# Patient Record
Sex: FEMALE | Race: WHITE | NOT HISPANIC OR LATINO | Employment: OTHER | URBAN - METROPOLITAN AREA
[De-identification: names, ages, dates, MRNs, and addresses within clinical notes are randomized per-mention and may not be internally consistent; named-entity substitution may affect disease eponyms.]

---

## 2017-08-21 LAB — MAMMOGRAM: NORMAL

## 2018-03-12 ENCOUNTER — HOSPITAL ENCOUNTER (EMERGENCY)
Facility: CLINIC | Age: 73
Discharge: HOME | End: 2018-03-12
Attending: EMERGENCY MEDICINE | Admitting: EMERGENCY MEDICINE
Payer: MEDICARE

## 2018-03-12 ENCOUNTER — APPOINTMENT (OUTPATIENT)
Dept: GENERAL RADIOLOGY | Facility: CLINIC | Age: 73
End: 2018-03-12
Attending: EMERGENCY MEDICINE
Payer: MEDICARE

## 2018-03-12 VITALS
OXYGEN SATURATION: 100 % | SYSTOLIC BLOOD PRESSURE: 155 MMHG | TEMPERATURE: 97.8 F | RESPIRATION RATE: 8 BRPM | HEIGHT: 61 IN | DIASTOLIC BLOOD PRESSURE: 77 MMHG

## 2018-03-12 DIAGNOSIS — R07.9 CHEST PAIN, UNSPECIFIED TYPE: ICD-10-CM

## 2018-03-12 LAB
ALBUMIN SERPL-MCNC: 3.7 G/DL (ref 3.4–5)
ALP SERPL-CCNC: 110 U/L (ref 40–150)
ALT SERPL W P-5'-P-CCNC: 18 U/L (ref 0–50)
ANION GAP SERPL CALCULATED.3IONS-SCNC: 6 MMOL/L (ref 3–14)
AST SERPL W P-5'-P-CCNC: 20 U/L (ref 0–45)
BASOPHILS # BLD AUTO: 0 10E9/L (ref 0–0.2)
BASOPHILS NFR BLD AUTO: 0.3 %
BILIRUB SERPL-MCNC: 0.4 MG/DL (ref 0.2–1.3)
BUN SERPL-MCNC: 35 MG/DL (ref 7–30)
CALCIUM SERPL-MCNC: 10.1 MG/DL (ref 8.5–10.1)
CHLORIDE SERPL-SCNC: 105 MMOL/L (ref 94–109)
CO2 SERPL-SCNC: 24 MMOL/L (ref 20–32)
CREAT SERPL-MCNC: 0.73 MG/DL (ref 0.52–1.04)
DIFFERENTIAL METHOD BLD: NORMAL
EOSINOPHIL # BLD AUTO: 0 10E9/L (ref 0–0.7)
EOSINOPHIL NFR BLD AUTO: 0.5 %
ERYTHROCYTE [DISTWIDTH] IN BLOOD BY AUTOMATED COUNT: 13.4 % (ref 10–15)
GFR SERPL CREATININE-BSD FRML MDRD: 78 ML/MIN/1.7M2
GLUCOSE SERPL-MCNC: 99 MG/DL (ref 70–99)
HCT VFR BLD AUTO: 39 % (ref 35–47)
HGB BLD-MCNC: 12.6 G/DL (ref 11.7–15.7)
IMM GRANULOCYTES # BLD: 0 10E9/L (ref 0–0.4)
IMM GRANULOCYTES NFR BLD: 0.3 %
LYMPHOCYTES # BLD AUTO: 1.4 10E9/L (ref 0.8–5.3)
LYMPHOCYTES NFR BLD AUTO: 23.2 %
MCH RBC QN AUTO: 29.7 PG (ref 26.5–33)
MCHC RBC AUTO-ENTMCNC: 32.3 G/DL (ref 31.5–36.5)
MCV RBC AUTO: 92 FL (ref 78–100)
MONOCYTES # BLD AUTO: 0.7 10E9/L (ref 0–1.3)
MONOCYTES NFR BLD AUTO: 11 %
NEUTROPHILS # BLD AUTO: 3.9 10E9/L (ref 1.6–8.3)
NEUTROPHILS NFR BLD AUTO: 64.7 %
PLATELET # BLD AUTO: 170 10E9/L (ref 150–450)
POTASSIUM SERPL-SCNC: 4 MMOL/L (ref 3.4–5.3)
PROT SERPL-MCNC: 7 G/DL (ref 6.8–8.8)
RBC # BLD AUTO: 4.24 10E12/L (ref 3.8–5.2)
SODIUM SERPL-SCNC: 135 MMOL/L (ref 133–144)
TROPONIN I SERPL-MCNC: <0.015 UG/L (ref 0–0.04)
WBC # BLD AUTO: 6 10E9/L (ref 4–11)

## 2018-03-12 PROCEDURE — 99285 EMERGENCY DEPT VISIT HI MDM: CPT | Mod: 25

## 2018-03-12 PROCEDURE — 80053 COMPREHEN METABOLIC PANEL: CPT | Performed by: FAMILY MEDICINE

## 2018-03-12 PROCEDURE — 99285 EMERGENCY DEPT VISIT HI MDM: CPT | Mod: 25 | Performed by: FAMILY MEDICINE

## 2018-03-12 PROCEDURE — 93010 ELECTROCARDIOGRAM REPORT: CPT | Mod: Z6 | Performed by: FAMILY MEDICINE

## 2018-03-12 PROCEDURE — 84484 ASSAY OF TROPONIN QUANT: CPT | Performed by: FAMILY MEDICINE

## 2018-03-12 PROCEDURE — 93005 ELECTROCARDIOGRAM TRACING: CPT

## 2018-03-12 PROCEDURE — 71046 X-RAY EXAM CHEST 2 VIEWS: CPT

## 2018-03-12 PROCEDURE — 85025 COMPLETE CBC W/AUTO DIFF WBC: CPT | Performed by: FAMILY MEDICINE

## 2018-03-12 NOTE — DISCHARGE INSTRUCTIONS
*CHEST PAIN, UNCERTAIN CAUSE    Based on your exam today, the exact cause of your chest pain is not certain. Your condition does not seem serious at this time, and your pain does not appear to be coming from your heart. However, sometimes the signs of a serious problem take more time to appear. Therefore, watch for the warning signs listed below.  HOME CARE:  1. Rest today and avoid strenuous activity.  2. Take any prescribed medicine as directed.  FOLLOW UP with your doctor in 1-3 days.   GET PROMPT MEDICAL ATTENTION if any of the following occur:    A change in the type of pain: if it feels different, becomes more severe, lasts longer, or begins to spread into your shoulder, arm, neck, jaw or back    Shortness of breath or increased pain with breathing    Weakness, dizziness, or fainting    Cough with blood or dark colored sputum (phlegm)    Fever over 101  F (38.3  C)    Swelling, pain or redness in one leg    5035-1561 The "[employer]". [address] [place] [address]. All rights reserved. This information is not intended as a substitute for professional medical care. Always follow your healthcare professional's instructions.  This information has been modified by your health care provider with permission from the publisher.    Call primary care today to set up cardiac stress test.  Try to avoid ibuprofen.  It is reasonable for you to take 81 mg aspirin daily.

## 2018-03-12 NOTE — ED AVS SNAPSHOT
[facility] Emergency Department    [address] [facility] [address]    Phone:  [phone]    Fax:  [phone]                                       Tracey Mandujano   MRN: [id number]    Department:  [facility] Emergency Department   Date of Visit:  3/12/2018           After Visit Summary Signature Page     I have received my discharge instructions, and my questions have been answered. I have discussed any challenges I see with this plan with the nurse or doctor.    ..........................................................................................................................................  Patient/Patient Representative Signature      ..........................................................................................................................................  Patient Representative Print Name and Relationship to Patient    ..................................................               ................................................  Date                                            Time    ..........................................................................................................................................  Reviewed by Signature/Title    ...................................................              ..............................................  Date                                                            Time

## 2018-03-12 NOTE — ED PROVIDER NOTES
History     Chief Complaint   Patient presents with     Chest Pain     HPI  Tracey Mandujano is a 72 year old female, with a history of HTN, Hyperlipidemia, DM 2 and spinal stenosis, who presents with chest pain. The chest pain started at 0815 this am when she was exercising at her aerobics class. She does not currently have any chest pain. She describes the chest pain more as a chest tightness that was 4-5/10, nonradiating, and was present intermittently for about 20 minutes. Nothing seemed to make the pain better or worse. The pain was not aggravated by movement or position. She continued to do her exercises during the 20 minutes and did not have shortness of breath, nausea, vomiting, headache, dizziness, abdominal pain, fevers or chills. No history of DVT, no lower leg swelling or tenderness. She has never had a heart attack or stroke. She had a normal stress echo in 2014. She has not had surgery recently. She has been being seen for spinal stenosis and has been taking 600-800 mg of Ibuprofen daily for the pain. She does not take aspirin or any blood thinners.     Problem List:    There are no active problems to display for this patient.  Hypertension  Hyperlipidemia  Spinal stenosis      Past Medical History:    No past medical history on file.  Hypertension  Hyperlipidemia  Diabetes mellitus, type 2  Spinal Stenosis    Past Surgical History:    No past surgical history on file.  No recent surgeries.     Family History:    No family history on file.   No family history of myocardial infarction or heart disease.  Positive family history of stroke in mother at age 79. Lived till 89.    Social History:  Marital Status:   [5]  Social History   Substance Use Topics     Smoking status: Not on file     Smokeless tobacco: Not on file     Alcohol use Not on file      She does not smoke or drink.   Lives alone with children living nearby.     Medications:      lisinopril (PRINIVIL,ZESTRIL) 5 MG tablet  "  citalopram (CELEXA) 20 MG tablet   simvastatin (ZOCOR) 20 MG tablet         Review of Systems   Constitutional: No fever, chills, malaise, weakness, or diaphoresis  HEENT: No blurry or change in vision. No trauma or falls.   Respiratory: No shortness of breath, cough, hemoptysis  Cardiovascular: Positive for chest pain at 0815 this am. Negative for current chest pain. No palpitations, syncope or lower extremity edema  Gastrointestinal: No nausea, vomiting, heartburn, diarrhea, constipation, or abdominal pain  Genitourinary: No urinary urgency or change in frequency or incontinence  Skin: No rashes  Musculoskeletal: No arthralgias, swelling, weakness, or chest wall tenderness  Hematologic: No bruising  Neurologic: No headache, dizziness, focal weakness, sensory loss        Physical Exam   BP: 139/76  Heart Rate: 56  Temp: 97.8  F (36.6  C)  Resp: 16  Height: 154.9 cm (5' 1\")  SpO2: 99 %      Physical Exam   Constitutional: well appearing well nourished, in no distress  HEENT: Normocephalic, atraumatic, conjunctiva clear, PERRLA, normal external ears, moist mucous membranes, mucosa non-inflamed  Cardiovascular: Rate 56. Normal rhythm, normal S1 and S2, no rubs, murmurs or gallops, peripheral pulses strong and equal bilaterally, no peripheral edema.   Respiratory: lung sounds clear to auscultation and equal bilaterally, no wheezes, crackles, or rhonchi  GI: soft, non-distended abdomen, non-tender to palpation, normoactive bowel sounds  Skin: warm and dry  Musculoskeletal: ROM intact of spine and extremities. No chest wall tenderness. No lower extremity tenderness, areas of warmth or erythema or stiffness.   Neuro: alert and oriented to person, time, and place, intact recent and remote memory, CN II-XII grossly intact  Psych: appropriate mood and affect, judgement and insight, cooperative with exam        ED Course     ED Course     Procedures               EKG Interpretation:      Interpreted by GENE Rodriguez " reviewed:   Symptoms at time of EKG: Asymptomatic   Rhythm: Sinus bradycardia  Rate: 54  Axis: normal  Ectopy: none  Conduction: normal  ST Segments/ T Waves: No ST-T wave changes  Q Waves: none  Comparison to prior: Unchanged from EKG in 2015.     Clinical Impression: normal EKG          Critical Care time:  None  Well's score Total: 0   - Clinical signs and symptoms of DVT: No (0)   - PE is #1 diagnosis OR equally likely: No (0)   - Heart rate > 100: No (0)   - Immobilization at least 3 days OR surgery in the previous 4 weeks: No (0)   - Previously objectively diagnosed PE or DVT: No (0)   - Hemoptysis: No (0)   - Malignancy w/ treatment within 6 months or palliative: No (0)               Results for orders placed or performed during the hospital encounter of 03/12/18 (from the past 24 hour(s))   CBC with platelets differential   Result Value Ref Range    WBC 6.0 4.0 - 11.0 10e9/L    RBC Count 4.24 3.8 - 5.2 10e12/L    Hemoglobin 12.6 11.7 - 15.7 g/dL    Hematocrit 39.0 35.0 - 47.0 %    MCV 92 78 - 100 fl    MCH 29.7 26.5 - 33.0 pg    MCHC 32.3 31.5 - 36.5 g/dL    RDW 13.4 10.0 - 15.0 %    Platelet Count 170 150 - 450 10e9/L    Diff Method Automated Method     % Neutrophils 64.7 %    % Lymphocytes 23.2 %    % Monocytes 11.0 %    % Eosinophils 0.5 %    % Basophils 0.3 %    % Immature Granulocytes 0.3 %    Absolute Neutrophil 3.9 1.6 - 8.3 10e9/L    Absolute Lymphocytes 1.4 0.8 - 5.3 10e9/L    Absolute Monocytes 0.7 0.0 - 1.3 10e9/L    Absolute Eosinophils 0.0 0.0 - 0.7 10e9/L    Absolute Basophils 0.0 0.0 - 0.2 10e9/L    Abs Immature Granulocytes 0.0 0 - 0.4 10e9/L   Comprehensive metabolic panel   Result Value Ref Range    Sodium 135 133 - 144 mmol/L    Potassium 4.0 3.4 - 5.3 mmol/L    Chloride 105 94 - 109 mmol/L    Carbon Dioxide 24 20 - 32 mmol/L    Anion Gap 6 3 - 14 mmol/L    Glucose 99 70 - 99 mg/dL    Urea Nitrogen 35 (H) 7 - 30 mg/dL    Creatinine 0.73 0.52 - 1.04 mg/dL    GFR Estimate 78 >60 mL/min/1.7m2     GFR Estimate If Black >90 >60 mL/min/1.7m2    Calcium 10.1 8.5 - 10.1 mg/dL    Bilirubin Total 0.4 0.2 - 1.3 mg/dL    Albumin 3.7 3.4 - 5.0 g/dL    Protein Total 7.0 6.8 - 8.8 g/dL    Alkaline Phosphatase 110 40 - 150 U/L    ALT 18 0 - 50 U/L    AST 20 0 - 45 U/L   Troponin I   Result Value Ref Range    Troponin I ES <0.015 0.000 - 0.045 ug/L   Chest XR,  PA & LAT    Narrative    XR CHEST 2 VW 3/12/2018 11:27 AM     HISTORY: pain;     COMPARISON: None      Impression    IMPRESSION: The cardiac silhouette and pulmonary vasculature are  within normal limits. No evidence of pneumothorax or pleural effusion.  The lungs are clear. There is a right shoulder prosthesis. Severe  degenerative changes in the left shoulder.    JUAN SAMUEL MD       Assessments & Plan (with Medical Decision Making)   Tracey Mandujano is a 72 year old female, with a history of HTN, Hyperlipidemia, DM 2 and spinal stenosis, who presents with chest discomfort.  Patient presents with precordial/substernal chest discomfort poorly localized, which started at 0815 this am without other signs of cardiac etiology including shortness of breath, diaphoresis, or nausea. She continued with her workout during the pain. She has had a normal stress echo in 2014 and last normal EKG in 2015 via care everywhere. Normal EKG today showing sinus bradycardia with a rate of 54. Troponin negative. CBC WNL. No current evidence to support ACS.  Recommend follow-up primary care for further evaluation, outpatient cardiac stress.  Recommend 81 mg aspirin daily, avoid ibuprofen, take Tylenol for discomfort.  Less likely a PE because she is not tachycardic, no SOB, Well's score of 0 and CXR shows no acute abnormalities. Musculoskeletal etiology is possible, but less likely given no chest wall tenderness and pain was not affected by movement or position. GERD could be a possibility or a hiatal hernia. Patient takes Omeprazole 20 mg a day and has no epigastric discomfort,  but still a possibility especially considering she takes Ibuprofen 600-800 mg a day that could be leading to some gastritis.       I have reviewed the nursing notes.    I have reviewed the findings, diagnosis, plan and need for follow up with the patient.       Discharge Medication List as of 3/12/2018 11:42 AM        I interviewed and examined the patient, supervised workup, discussed differential diagnoses, reviewed results of studies, agree with assessment, plan and contents of note.  Dr. Jessee Zafar        Final diagnoses:   Chest pain, unspecified type       Derrick Apodaca, MS3      3/12/2018   [facility] EMERGENCY DEPARTMENT     Jessee Zafar MD  03/12/18 5905

## 2018-03-12 NOTE — ED NOTES
Pt here with chest pain that started about 2 hours ago while she was at the gym. Pt was lifting weights in an aerobics class when the pain started, lasted about 15 minutes, currently pain free. She has had chest pain twice before and had stress tests done, was negative. Denies radiation, nausea or diaphoresis but states 'I did feel funny in my mouth'

## 2018-03-12 NOTE — ED AVS SNAPSHOT
[facility] Emergency Department    [address] [address]    Phone:  [phone]    Fax:  [phone]                                       Tracey Mandujano   MRN: [id number]    Department:  [facility] Emergency Department   Date of Visit:  3/12/2018           Patient Information     Date Of Birth          1945        Your diagnoses for this visit were:     Chest pain, unspecified type        You were seen by Jessee Zafar MD.      Follow-up Information     Follow up with Clinic, [facility].    Specialty:  Clinic    Contact information:    [address] [facility] [id number] [phone]          Discharge Instructions          *CHEST PAIN, UNCERTAIN CAUSE    Based on your exam today, the exact cause of your chest pain is not certain. Your condition does not seem serious at this time, and your pain does not appear to be coming from your heart. However, sometimes the signs of a serious problem take more time to appear. Therefore, watch for the warning signs listed below.  HOME CARE:  1. Rest today and avoid strenuous activity.  2. Take any prescribed medicine as directed.  FOLLOW UP with your doctor in 1-3 days.   GET PROMPT MEDICAL ATTENTION if any of the following occur:    A change in the type of pain: if it feels different, becomes more severe, lasts longer, or begins to spread into your shoulder, arm, neck, jaw or back    Shortness of breath or increased pain with breathing    Weakness, dizziness, or fainting    Cough with blood or dark colored sputum (phlegm)    Fever over 101  F (38.3  C)    Swelling, pain or redness in one leg    0379-7819 [employer]. [address] [place] [address]. All rights reserved. This information is not intended as a substitute for professional medical care. Always follow your healthcare professional's instructions.  This information has been modified by your health care provider with permission from the  publisher.    Call primary care today to set up cardiac stress test.  Try to avoid ibuprofen.  It is reasonable for you to take 81 mg aspirin daily.    24 Hour Appointment Hotline       To make an appointment at any [facility], call [phone] ([phone]). If you don't have a family doctor or clinic, we will help you find one. [facility] clinics are conveniently located to serve the needs of you and your family.             Review of your medicines      Our records show that you are taking the medicines listed below. If these are incorrect, please call your family doctor or clinic.        Dose / Directions Last dose taken    citalopram 20 MG tablet   Commonly known as:  celeXA   Dose:  20 mg        Take 20 mg by mouth daily   Refills:  3        lisinopril 5 MG tablet   Commonly known as:  PRINIVIL/ZESTRIL        Refills:  0        simvastatin 20 MG tablet   Commonly known as:  ZOCOR        Refills:  0                Procedures and tests performed during your visit     CBC with platelets differential    Cardiac Continuous Monitoring    Chest XR,  PA & LAT    Comprehensive metabolic panel    EKG 12 lead    Peripheral IV catheter    Troponin I      Orders Needing Specimen Collection     None      Pending Results     No orders found from 3/10/2018 to 3/13/2018.            Pending Culture Results     No orders found from 3/10/2018 to 3/13/2018.            Pending Results Instructions     If you had any lab results that were not finalized at the time of your Discharge, you can call the ED Lab Result RN at [phone]. You will be contacted by this team for any positive Lab results or changes in treatment. The nurses are available 7 days a week from 10A to 6:30P.  You can leave a message 24 hours per day and they will return your call.        Test Results From Your Hospital Stay        3/12/2018 10:41 AM      Component Results     Component Value Ref Range & Units Status    WBC 6.0 4.0 - 11.0 10e9/L Final     RBC Count 4.24 3.8 - 5.2 10e12/L Final    Hemoglobin 12.6 11.7 - 15.7 g/dL Final    Hematocrit 39.0 35.0 - 47.0 % Final    MCV 92 78 - 100 fl Final    MCH 29.7 26.5 - 33.0 pg Final    MCHC 32.3 31.5 - 36.5 g/dL Final    RDW 13.4 10.0 - 15.0 % Final    Platelet Count 170 150 - 450 10e9/L Final    Diff Method Automated Method  Final    % Neutrophils 64.7 % Final    % Lymphocytes 23.2 % Final    % Monocytes 11.0 % Final    % Eosinophils 0.5 % Final    % Basophils 0.3 % Final    % Immature Granulocytes 0.3 % Final    Absolute Neutrophil 3.9 1.6 - 8.3 10e9/L Final    Absolute Lymphocytes 1.4 0.8 - 5.3 10e9/L Final    Absolute Monocytes 0.7 0.0 - 1.3 10e9/L Final    Absolute Eosinophils 0.0 0.0 - 0.7 10e9/L Final    Absolute Basophils 0.0 0.0 - 0.2 10e9/L Final    Abs Immature Granulocytes 0.0 0 - 0.4 10e9/L Final         3/12/2018 11:18 AM      Component Results     Component Value Ref Range & Units Status    Sodium 135 133 - 144 mmol/L Final    Potassium 4.0 3.4 - 5.3 mmol/L Final    Chloride 105 94 - 109 mmol/L Final    Carbon Dioxide 24 20 - 32 mmol/L Final    Anion Gap 6 3 - 14 mmol/L Final    Glucose 99 70 - 99 mg/dL Final    Urea Nitrogen 35 (H) 7 - 30 mg/dL Final    Creatinine 0.73 0.52 - 1.04 mg/dL Final    GFR Estimate 78 >60 mL/min/1.7m2 Final    Non  GFR Calc    GFR Estimate If Black >90 >60 mL/min/1.7m2 Final    African American GFR Calc    Calcium 10.1 8.5 - 10.1 mg/dL Final    Bilirubin Total 0.4 0.2 - 1.3 mg/dL Final    Albumin 3.7 3.4 - 5.0 g/dL Final    Protein Total 7.0 6.8 - 8.8 g/dL Final    Alkaline Phosphatase 110 40 - 150 U/L Final    ALT 18 0 - 50 U/L Final    AST 20 0 - 45 U/L Final         3/12/2018 11:18 AM      Component Results     Component Value Ref Range & Units Status    Troponin I ES <0.015 0.000 - 0.045 ug/L Final    The 99th percentile for upper reference range is 0.045 ug/L.  Troponin values   in the range of 0.045 - 0.120 ug/L may be associated with risks of adverse  "  clinical events.           3/12/2018 11:29 AM      Narrative     XR CHEST 2 VW 3/12/2018 11:27 AM     HISTORY: pain;     COMPARISON: None        Impression     IMPRESSION: The cardiac silhouette and pulmonary vasculature are  within normal limits. No evidence of pneumothorax or pleural effusion.  The lungs are clear. There is a right shoulder prosthesis. Severe  degenerative changes in the left shoulder.    JUAN SAMUEL MD                Thank you for choosing [facility]       Thank you for choosing [facility] for your care. Our goal is always to provide you with excellent care. Hearing back from our patients is one way we can continue to improve our services. Please take a few minutes to complete the written survey that you may receive in the mail after you visit with us. Thank you!        [employer] Information     [employer] lets you send messages to your doctor, view your test results, renew your prescriptions, schedule appointments and more. To sign up, go to [email or web address] . Click on \"Log in\" on the left side of the screen, which will take you to the Welcome page. Then click on \"Sign up Now\" on the right side of the page.     You will be asked to enter the access code listed below, as well as some personal information. Please follow the directions to create your username and password.     Your access code is: [email or web address]  Expires: 6/10/2018 11:42 AM     Your access code will  in 90 days. If you need help or a new code, please call your [facility] clinic or [phone].        Care EveryWhere ID     This is your Care EveryWhere ID. This could be used by other organizations to access your [facility] medical records  [id number]        Equal Access to Services     [facility]: Hadii clarisa Gibbs, waaxda luqadaha, qaybta kaaldov nunez, piedad albert. So [facility] [phone].    ATENCIÓN: Si habla español, tiene a grimm disposición servicios gratuitos de asistencia " christ Meltonhorace al [phone].    We comply with applicable federal civil rights laws and [place] laws. We do not discriminate on the basis of race, color, national origin, age, disability, sex, sexual orientation, or gender identity.            After Visit Summary       This is your record. Keep this with you and show to your community pharmacist(s) and doctor(s) at your next visit.

## 2019-03-18 ENCOUNTER — TRANSFERRED RECORDS (OUTPATIENT)
Dept: HEALTH INFORMATION MANAGEMENT | Facility: CLINIC | Age: 74
End: 2019-03-18

## 2019-03-27 ENCOUNTER — TRANSFERRED RECORDS (OUTPATIENT)
Dept: HEALTH INFORMATION MANAGEMENT | Facility: CLINIC | Age: 74
End: 2019-03-27

## 2019-04-23 ENCOUNTER — TRANSFERRED RECORDS (OUTPATIENT)
Dept: HEALTH INFORMATION MANAGEMENT | Facility: CLINIC | Age: 74
End: 2019-04-23

## 2019-04-24 ENCOUNTER — TRANSFERRED RECORDS (OUTPATIENT)
Dept: HEALTH INFORMATION MANAGEMENT | Facility: CLINIC | Age: 74
End: 2019-04-24

## 2019-06-27 ENCOUNTER — DOCUMENTATION ONLY (OUTPATIENT)
Dept: CARE COORDINATION | Facility: CLINIC | Age: 74
End: 2019-06-27

## 2019-07-17 NOTE — TELEPHONE ENCOUNTER
RECORDS RECEIVED FROM: lumbar spine radiculopathy/MRI, EMG done/Pt has also done PT/referred by Dr Lilli Carlos at [facility]/recs and referral faxed to clinic/appt per pt   DATE RECEIVED: Aug 6, 2019    NOTES STATUS DETAILS   OFFICE NOTE from referring provider Received  3/27/19 [facility]   OFFICE NOTE from other specialist Received  [facility] 3/18/19   DISCHARGE SUMMARY from hospital N/A    DISCHARGE REPORT from the ER N/A    OPERATIVE REPORT N/A    MEDICATION LIST Received     IMPLANT RECORD/STICKER N/A    LABS     CBC/DIFF N/A    CULTURES N/A    INJECTIONS DONE IN RADIOLOGY N/a     MRI Received  3/18/19   CT SCAN N/a     XRAYS (IMAGES & REPORTS) N/a     TUMOR     PATHOLOGY  Slides & report N/A      07/17/19   5:23 PM  Appointment notes incorrect: no records or referral have been received in clinic  Faxed request for all recs to [facility]    07/29/19   2:19 PM  Faxed 2nd request to [facility]    07/31/19   3:58 PM  Called [facility], and was told records were faxed on 7/19 and images were mailed. I requested that they be re-faxed, as no records have yet been received.

## 2019-08-06 ENCOUNTER — PRE VISIT (OUTPATIENT)
Dept: ORTHOPEDICS | Facility: CLINIC | Age: 74
End: 2019-08-06

## 2019-08-06 ENCOUNTER — OFFICE VISIT (OUTPATIENT)
Dept: ORTHOPEDICS | Facility: CLINIC | Age: 74
End: 2019-08-06
Payer: MEDICARE

## 2019-08-06 VITALS — BODY MASS INDEX: 34.93 KG/M2 | RESPIRATION RATE: 16 BRPM | WEIGHT: 185 LBS | HEIGHT: 61 IN

## 2019-08-06 DIAGNOSIS — M51.16 LUMBAR DISC HERNIATION WITH RADICULOPATHY: ICD-10-CM

## 2019-08-06 DIAGNOSIS — M54.16 LUMBAR RADICULOPATHY: Primary | ICD-10-CM

## 2019-08-06 RX ORDER — FOLIC ACID 1 MG/1
1 TABLET ORAL EVERY MORNING
COMMUNITY
Start: 2009-11-18

## 2019-08-06 RX ORDER — OXYBUTYNIN CHLORIDE 5 MG/1
10 TABLET, EXTENDED RELEASE ORAL EVERY MORNING
COMMUNITY
Start: 2019-07-25

## 2019-08-06 RX ORDER — GABAPENTIN 300 MG/1
CAPSULE ORAL
COMMUNITY
Start: 2018-10-18 | End: 2019-11-04

## 2019-08-06 RX ORDER — METHYLPREDNISOLONE 4 MG
TABLET, DOSE PACK ORAL
Qty: 21 TABLET | Refills: 0 | Status: SHIPPED | OUTPATIENT
Start: 2019-08-06 | End: 2019-12-16

## 2019-08-06 RX ORDER — GABAPENTIN 300 MG/1
300 CAPSULE ORAL 4 TIMES DAILY
Qty: 120 CAPSULE | Refills: 1 | Status: SHIPPED | OUTPATIENT
Start: 2019-08-06 | End: 2019-11-04

## 2019-08-06 ASSESSMENT — MIFFLIN-ST. JEOR: SCORE: 1281.53

## 2019-08-06 NOTE — LETTER
8/6/2019       RE: Tracey Mandujano  [address] [place] [address]     Dear Colleague,    Thank you for referring your patient, Tracey Mandujano, to the [facility]. Please see a copy of my visit note below.    Notes radiating left pain radicular symptoms, 2 years.     HISTORY OF PRESENT ILLNESS  Ms. Mandujano is a pleasant 73 year old year old female who presents to clinic today with chronic low back pain  Tracey explains that she has had on/off low back pain with radicular symptoms for over 2 years  She has pain and tingling that goes down her legs  Location: low back  Quality:  achy pain    Severity: 6/10 at worst    Duration: see above  Timing: occurs intermittently    Modifying factors:  resting and non-use makes it better, movement and use makes it worse  Associated signs & symptoms: radicular pain    Additional history: as documented    MEDICAL HISTORY  There is no problem list on file for this patient.    Current Outpatient Medications   Medication Sig Dispense Refill     cholecalciferol 1000 units TABS Take 1,000 Units by mouth       citalopram (CELEXA) 20 MG tablet Take 20 mg by mouth daily  3     folic acid (FOLVITE) 1 MG tablet        gabapentin (NEURONTIN) 300 MG capsule 1 cap daily X 1 wk, 1 cap twice daily X 1 wk, 1 cap tid.       IRON PO        oxybutynin ER (DITROPAN-XL) 5 MG 24 hr tablet TAKE 1 TABLET BY MOUTH ONCE DAILY       simvastatin (ZOCOR) 20 MG tablet        vitamin (B COMPLEX) tablet Take 1 tablet by mouth       lisinopril (PRINIVIL,ZESTRIL) 5 MG tablet          No Known Allergies    No family history on file.    Additional medical/Social/Surgical histories reviewed in [facility] and updated as appropriate.     REVIEW OF SYSTEMS (8/6/2019)  10 point ROS of systems including Constitutional, Eyes, Respiratory, Cardiovascular, Gastroenterology, Genitourinary, Integumentary, Musculoskeletal, Psychiatric were all negative  "except for pertinent positives noted in my HPI.     PHYSICAL EXAM  Vitals:    08/06/19 1343   Resp: 16   Weight: 83.9 kg (185 lb)   Height: 1.549 m (5' 1\")     Vital Signs: Resp 16   Ht 1.549 m (5' 1\")   Wt 83.9 kg (185 lb)   BMI 34.96 kg/m    Patient declined being weighed. Body mass index is 34.96 kg/m .    General  - normal appearance, in no obvious distress  CV  - normal peripheral perfusion  Pulm  - normal respiratory pattern, non-labored  Musculoskeletal - lumbar spine  - stance: normal gait without limp, no obvious leg length discrepancy, normal heel and toe walk  - inspection: normal bone and joint alignment, no obvious scoliosis  - palpation: no paravertebral or bony tenderness  - ROM: flexion exacerbates pain, normal extension, sidebending, rotation  - strength: lower extremities 5/5 in all planes  - special tests:  (+) straight leg raise  (+) slump test  Neuro  - patellar and Achilles DTRs 2+ bilaterally, lower extremity sensory deficit throughout L5 distribution, grossly normal coordination, normal muscle tone  Skin  - no ecchymosis, erythema, warmth, or induration, no obvious rash  Psych  - interactive, appropriate, normal mood and affect    ASSESSMENT & PLAN  72 yo female with lumar ddd, anterolisthesis, radicular pain  reviwed MRI: shows L4/5 anterolisthesis and disc herniation  Ordered Matthew  Consider PT  Cont. Gabapentin  Medrol pack  tizanadine    Tolu Pablo MD, CAQSM    "

## 2019-08-06 NOTE — PROGRESS NOTES
"HISTORY OF PRESENT ILLNESS  Ms. Mandujano is a pleasant 73 year old year old female who presents to clinic today with chronic low back pain  Tracey explains that she has had on/off low back pain with radicular symptoms for over 2 years  She has pain and tingling that goes down her legs  Location: low back  Quality:  achy pain    Severity: 6/10 at worst    Duration: see above  Timing: occurs intermittently    Modifying factors:  resting and non-use makes it better, movement and use makes it worse  Associated signs & symptoms: radicular pain    Additional history: as documented    MEDICAL HISTORY  There is no problem list on file for this patient.      Current Outpatient Medications   Medication Sig Dispense Refill     cholecalciferol 1000 units TABS Take 1,000 Units by mouth       citalopram (CELEXA) 20 MG tablet Take 20 mg by mouth daily  3     folic acid (FOLVITE) 1 MG tablet        gabapentin (NEURONTIN) 300 MG capsule 1 cap daily X 1 wk, 1 cap twice daily X 1 wk, 1 cap tid.       IRON PO        oxybutynin ER (DITROPAN-XL) 5 MG 24 hr tablet TAKE 1 TABLET BY MOUTH ONCE DAILY       simvastatin (ZOCOR) 20 MG tablet        vitamin (B COMPLEX) tablet Take 1 tablet by mouth       lisinopril (PRINIVIL,ZESTRIL) 5 MG tablet          No Known Allergies    No family history on file.    Additional medical/Social/Surgical histories reviewed in [facility] and updated as appropriate.     REVIEW OF SYSTEMS (8/6/2019)  10 point ROS of systems including Constitutional, Eyes, Respiratory, Cardiovascular, Gastroenterology, Genitourinary, Integumentary, Musculoskeletal, Psychiatric were all negative except for pertinent positives noted in my HPI.     PHYSICAL EXAM  Vitals:    08/06/19 1343   Resp: 16   Weight: 83.9 kg (185 lb)   Height: 1.549 m (5' 1\")     Vital Signs: Resp 16   Ht 1.549 m (5' 1\")   Wt 83.9 kg (185 lb)   BMI 34.96 kg/m   Patient declined being weighed. Body mass index is 34.96 kg/m .    General  - normal appearance, in no " obvious distress  CV  - normal peripheral perfusion  Pulm  - normal respiratory pattern, non-labored  Musculoskeletal - lumbar spine  - stance: normal gait without limp, no obvious leg length discrepancy, normal heel and toe walk  - inspection: normal bone and joint alignment, no obvious scoliosis  - palpation: no paravertebral or bony tenderness  - ROM: flexion exacerbates pain, normal extension, sidebending, rotation  - strength: lower extremities 5/5 in all planes  - special tests:  (+) straight leg raise  (+) slump test  Neuro  - patellar and Achilles DTRs 2+ bilaterally, lower extremity sensory deficit throughout L5 distribution, grossly normal coordination, normal muscle tone  Skin  - no ecchymosis, erythema, warmth, or induration, no obvious rash  Psych  - interactive, appropriate, normal mood and affect    ASSESSMENT & PLAN  74 yo female with lumar ddd, anterolisthesis, radicular pain  reviwed MRI: shows L4/5 anterolisthesis and disc herniation  Ordered Matthew  Consider PT  Cont. Gabapentin  Medrol pack  tizanadine      Tolu Pablo MD, CAQSM

## 2019-08-13 ENCOUNTER — TELEPHONE (OUTPATIENT)
Dept: ORTHOPEDICS | Facility: CLINIC | Age: 74
End: 2019-08-13

## 2019-08-13 NOTE — TELEPHONE ENCOUNTER
[facility] called requesting clarification on whether patient's order was for L4/5 translaminar or transforaminal. Per Dr. del rio- L4/5 Translaminar as in order.   CAMRON Daniel

## 2019-09-10 ENCOUNTER — OFFICE VISIT (OUTPATIENT)
Dept: ORTHOPEDICS | Facility: CLINIC | Age: 74
End: 2019-09-10
Payer: MEDICARE

## 2019-09-10 VITALS — WEIGHT: 185 LBS | HEIGHT: 61 IN | BODY MASS INDEX: 34.93 KG/M2

## 2019-09-10 DIAGNOSIS — M51.16 LUMBAR DISC HERNIATION WITH RADICULOPATHY: ICD-10-CM

## 2019-09-10 DIAGNOSIS — M54.16 LUMBAR RADICULAR PAIN: Primary | ICD-10-CM

## 2019-09-10 DIAGNOSIS — M51.369 DDD (DEGENERATIVE DISC DISEASE), LUMBAR: ICD-10-CM

## 2019-09-10 PROCEDURE — 99213 OFFICE O/P EST LOW 20 MIN: CPT | Performed by: PREVENTIVE MEDICINE

## 2019-09-10 ASSESSMENT — PAIN SCALES - GENERAL: PAINLEVEL: MILD PAIN (2)

## 2019-09-10 ASSESSMENT — MIFFLIN-ST. JEOR: SCORE: 1276.53

## 2019-09-10 NOTE — PROGRESS NOTES
"HISTORY OF PRESENT ILLNESS  Ms. Mandujano is a pleasant 74 year old year old female who presents to clinic today for followup for lumbar NAVID  Has had some improvement from her previous injection, but it is starting to wear off.  She is having pain and tingling in her feet  And occasionally gets pain down her legs and pain in low back    MEDICAL HISTORY  There is no problem list on file for this patient.      Current Outpatient Medications   Medication Sig Dispense Refill     cholecalciferol 1000 units TABS Take 1,000 Units by mouth       citalopram (CELEXA) 20 MG tablet Take 20 mg by mouth daily  3     folic acid (FOLVITE) 1 MG tablet        gabapentin (NEURONTIN) 300 MG capsule 1 cap daily X 1 wk, 1 cap twice daily X 1 wk, 1 cap tid.       gabapentin (NEURONTIN) 300 MG capsule Take 1 capsule (300 mg) by mouth 4 times daily 120 capsule 1     IRON PO        lisinopril (PRINIVIL,ZESTRIL) 5 MG tablet        methylPREDNISolone (MEDROL DOSEPAK) 4 MG tablet therapy pack Follow Package Directions 21 tablet 0     oxybutynin ER (DITROPAN-XL) 5 MG 24 hr tablet TAKE 1 TABLET BY MOUTH ONCE DAILY       simvastatin (ZOCOR) 20 MG tablet        tiZANidine (ZANAFLEX) 4 MG tablet Take 1-2 tablets (4-8 mg) by mouth nightly as needed 30 tablet 1     vitamin (B COMPLEX) tablet Take 1 tablet by mouth         No Known Allergies    No family history on file.    Additional medical/Social/Surgical histories reviewed in [facility] and updated as appropriate.     REVIEW OF SYSTEMS (9/10/2019)  10 point ROS of systems including Constitutional, Eyes, Respiratory, Cardiovascular, Gastroenterology, Genitourinary, Integumentary, Musculoskeletal, Psychiatric were all negative except for pertinent positives noted in my HPI.     PHYSICAL EXAM  Vitals:    09/10/19 0918   Weight: 83.9 kg (185 lb)   Height: 1.549 m (5' 1\")     Vital Signs: Ht 1.549 m (5' 1\")   Wt 83.9 kg (185 lb)   BMI 34.96 kg/m   Patient declined being weighed. Body mass index is 34.96 " kg/m .    General  - normal appearance, in no obvious distress  CV  - normal peripheral perfusion  Pulm  - normal respiratory pattern, non-labored  Musculoskeletal - lumbar spine  - stance: normal gait without limp, no obvious leg length discrepancy, normal heel and toe walk  - inspection: normal bone and joint alignment, no obvious scoliosis  - palpation: no paravertebral or bony tenderness  - ROM: flexion exacerbates low back pain, normal extension, sidebending, rotation  - strength: lower extremities 5/5 in all planes  - special tests:  (+) straight leg raise  (+) slump test  Neuro  - patellar and Achilles DTRs 2+ bilaterally, bilateral lower extremity sensory deficit throughout L5 distribution, grossly normal coordination, normal muscle tone  Skin  - no ecchymosis, erythema, warmth, or induration, no obvious rash  Psych  - interactive, appropriate, normal mood and affect    ASSESSMENT & PLAN  75 yo female with lumbar ddd, radicular pain  Reviewed her previous MRI  Ordered Another NAVID  Referral to Spine surgery  F/u in 1 month    Tolu Pablo MD, CAQSM

## 2019-09-10 NOTE — PATIENT INSTRUCTIONS
Thanks for coming today.  [facility]  [address] [place] [address]    To schedule future appointments in Ortho Clinic, you may call [phone].    To schedule ordered imaging by your provider:   Call [facility] [phone]    To schedule an injection ordered by your provider:  Call [facility] Injection scheduling line: [phone]  [facility] available online at:  [email or web address]    Please call if any further questions or concerns ([phone]).  Clinic hours 8 am to 5 pm.    Return to clinic (call) if symptoms worsen or fail to improve.

## 2019-09-10 NOTE — LETTER
9/10/2019         RE: Tracey Mandujano  [address] [place] [address]        Dear Colleague,    Thank you for referring your patient, Tracey Mandujano, to the [facility]. Please see a copy of my visit note below.    HISTORY OF PRESENT ILLNESS  Ms. Mandujano is a pleasant 74 year old year old female who presents to clinic today for followup for lumbar NAVID  Has had some improvement from her previous injection, but it is starting to wear off.  She is having pain and tingling in her feet  And occasionally gets pain down her legs and pain in low back    MEDICAL HISTORY  There is no problem list on file for this patient.      Current Outpatient Medications   Medication Sig Dispense Refill     cholecalciferol 1000 units TABS Take 1,000 Units by mouth       citalopram (CELEXA) 20 MG tablet Take 20 mg by mouth daily  3     folic acid (FOLVITE) 1 MG tablet        gabapentin (NEURONTIN) 300 MG capsule 1 cap daily X 1 wk, 1 cap twice daily X 1 wk, 1 cap tid.       gabapentin (NEURONTIN) 300 MG capsule Take 1 capsule (300 mg) by mouth 4 times daily 120 capsule 1     IRON PO        lisinopril (PRINIVIL,ZESTRIL) 5 MG tablet        methylPREDNISolone (MEDROL DOSEPAK) 4 MG tablet therapy pack Follow Package Directions 21 tablet 0     oxybutynin ER (DITROPAN-XL) 5 MG 24 hr tablet TAKE 1 TABLET BY MOUTH ONCE DAILY       simvastatin (ZOCOR) 20 MG tablet        tiZANidine (ZANAFLEX) 4 MG tablet Take 1-2 tablets (4-8 mg) by mouth nightly as needed 30 tablet 1     vitamin (B COMPLEX) tablet Take 1 tablet by mouth         No Known Allergies    No family history on file.    Additional medical/Social/Surgical histories reviewed in [facility] and updated as appropriate.     REVIEW OF SYSTEMS (9/10/2019)  10 point ROS of systems including Constitutional, Eyes, Respiratory, Cardiovascular, Gastroenterology, Genitourinary, Integumentary, Musculoskeletal, Psychiatric were all negative except for pertinent positives  "noted in my HPI.     PHYSICAL EXAM  Vitals:    09/10/19 0918   Weight: 83.9 kg (185 lb)   Height: 1.549 m (5' 1\")     Vital Signs: Ht 1.549 m (5' 1\")   Wt 83.9 kg (185 lb)   BMI 34.96 kg/m    Patient declined being weighed. Body mass index is 34.96 kg/m .    General  - normal appearance, in no obvious distress  CV  - normal peripheral perfusion  Pulm  - normal respiratory pattern, non-labored  Musculoskeletal - lumbar spine  - stance: normal gait without limp, no obvious leg length discrepancy, normal heel and toe walk  - inspection: normal bone and joint alignment, no obvious scoliosis  - palpation: no paravertebral or bony tenderness  - ROM: flexion exacerbates low back pain, normal extension, sidebending, rotation  - strength: lower extremities 5/5 in all planes  - special tests:  (+) straight leg raise  (+) slump test  Neuro  - patellar and Achilles DTRs 2+ bilaterally, bilateral lower extremity sensory deficit throughout L5 distribution, grossly normal coordination, normal muscle tone  Skin  - no ecchymosis, erythema, warmth, or induration, no obvious rash  Psych  - interactive, appropriate, normal mood and affect    ASSESSMENT & PLAN  75 yo female with lumbar ddd, radicular pain  Reviewed her previous MRI  Ordered Another NAVID  Referral to Spine surgery  F/u in 1 month    Tolu Pablo MD, CAQSM    Again, thank you for allowing me to participate in the care of your patient.        Sincerely,        Tolu Pablo MD    "

## 2019-09-11 ENCOUNTER — TELEPHONE (OUTPATIENT)
Dept: ORTHOPEDICS | Facility: CLINIC | Age: 74
End: 2019-09-11

## 2019-09-11 NOTE — TELEPHONE ENCOUNTER
----- Message from Emanuel Mcgregor MD sent at 9/10/2019 11:15 AM CDT -----  Regarding: RE: referral for surgical consultation  Thanks Tai!    Schedulers: Can you please arrange a visit with me for this patient?  Thank you!    Ramiro  ----- Message -----  From: Tolu Pablo MD  Sent: 9/10/2019  10:31 AM  To: Emanuel Mcgregor MD  Subject: referral for surgical consultation               Ramiro, can you get Mare in to discuss spine surgery options for her lumbar DDD, and anterolisthesis?  ThanksTai

## 2019-09-12 ENCOUNTER — ANCILLARY PROCEDURE (OUTPATIENT)
Dept: GENERAL RADIOLOGY | Facility: CLINIC | Age: 74
End: 2019-09-12
Attending: PREVENTIVE MEDICINE
Payer: MEDICARE

## 2019-09-12 VITALS — SYSTOLIC BLOOD PRESSURE: 141 MMHG | OXYGEN SATURATION: 97 % | HEART RATE: 61 BPM | DIASTOLIC BLOOD PRESSURE: 78 MMHG

## 2019-09-12 DIAGNOSIS — M51.16 LUMBAR DISC HERNIATION WITH RADICULOPATHY: ICD-10-CM

## 2019-09-12 DIAGNOSIS — M51.369 DDD (DEGENERATIVE DISC DISEASE), LUMBAR: ICD-10-CM

## 2019-09-12 PROCEDURE — 62323 NJX INTERLAMINAR LMBR/SAC: CPT | Performed by: RADIOLOGY

## 2019-09-12 RX ORDER — IOPAMIDOL 408 MG/ML
10 INJECTION, SOLUTION INTRATHECAL ONCE
Status: COMPLETED | OUTPATIENT
Start: 2019-09-12 | End: 2019-09-12

## 2019-09-12 RX ORDER — BUPIVACAINE HYDROCHLORIDE 5 MG/ML
2 INJECTION, SOLUTION EPIDURAL; INTRACAUDAL ONCE
Status: COMPLETED | OUTPATIENT
Start: 2019-09-12 | End: 2019-09-12

## 2019-09-12 RX ORDER — METHYLPREDNISOLONE ACETATE 80 MG/ML
80 INJECTION, SUSPENSION INTRA-ARTICULAR; INTRALESIONAL; INTRAMUSCULAR; SOFT TISSUE ONCE
Status: COMPLETED | OUTPATIENT
Start: 2019-09-12 | End: 2019-09-12

## 2019-09-12 RX ADMIN — BUPIVACAINE HYDROCHLORIDE 20 MG: 5 INJECTION, SOLUTION EPIDURAL; INTRACAUDAL at 14:29

## 2019-09-12 RX ADMIN — IOPAMIDOL 2 ML: 408 INJECTION, SOLUTION INTRATHECAL at 14:30

## 2019-09-12 RX ADMIN — METHYLPREDNISOLONE ACETATE 80 MG: 80 INJECTION, SUSPENSION INTRA-ARTICULAR; INTRALESIONAL; INTRAMUSCULAR; SOFT TISSUE at 14:30

## 2019-09-12 NOTE — PROGRESS NOTES
AFTER YOU GO HOME    ? DO relax; minimize your activity for 24 hours  ? You may resume normal activity tomorrow  ? You may remove the bandage in the evening or next morning  ? You may resume bathing the next day  ? Drink at least 4 extra glasses of fluid today if not on fluid restrictions  ? DO NOT drive or operate machinery at home or at work for at least 24 hours      VISIT THE EMERGENCY ROOM OR URGENT CARE IF:    ? There is redness or swelling at the injection site  ? There is discharge from the injection site  ? You develop a temperature of 101  F or greater      ADDITIONAL INSTRUCTIONS:    ? You may resume your Coumadin or other blood thinner at your regular dose today.  Follow up with your physician to have your INR rechecked if indicated.  ? If you gain no relief from the injection after two (2) weeks, follow-up with your provider for your options.        Contacts:    During business hours from 8 to 5 pm, you may call [phone] to reach a nurse advisor at [facility].  After hours, call [facility]  [phone].  Ask for the Radiologist on-call.  Someone is on-call 24 hrs/day.  [facility] Toll Free Number   [phone]

## 2019-09-12 NOTE — PROGRESS NOTES
: Mare was seen in X-ray today for a lumbar epidural injection. Patient rated pain before procedure 4/10. After procedure patient rated pain -/10. This pain level is acceptable to patient. Patient discharged home with son.

## 2019-09-16 ENCOUNTER — TELEPHONE (OUTPATIENT)
Dept: ORTHOPEDICS | Facility: CLINIC | Age: 74
End: 2019-09-16

## 2019-09-16 NOTE — TELEPHONE ENCOUNTER
Called pt to schedule new pt appt with Dr Mcgregor regarding lumbar DDD, and anterolisthesis. Pt said she wasn't sure if she wanted to have surgery. She will think about it and call back. Gave pt clinic number.

## 2019-09-17 ENCOUNTER — TELEPHONE (OUTPATIENT)
Dept: ORTHOPEDICS | Facility: CLINIC | Age: 74
End: 2019-09-17

## 2019-09-17 NOTE — TELEPHONE ENCOUNTER
[facility] Call Center    Phone Message    May a detailed message be left on voicemail: yes    Reason for Call: Other: Pt would like a call back. 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 Please advise     Action Taken: Message routed to:  Adult Clinics: Sports Medicine p [phone]

## 2019-09-18 ENCOUNTER — MYC MEDICAL ADVICE (OUTPATIENT)
Dept: ORTHOPEDICS | Facility: CLINIC | Age: 74
End: 2019-09-18

## 2019-09-18 NOTE — TELEPHONE ENCOUNTER
Spoke to Dr. Pablo and he did let me know the diagnosis, spondylolisthesis.     Wrote to patient via MyChart with diagnosis.

## 2019-09-26 ENCOUNTER — OFFICE VISIT (OUTPATIENT)
Dept: ORTHOPEDICS | Facility: CLINIC | Age: 74
End: 2019-09-26
Payer: MEDICARE

## 2019-09-26 DIAGNOSIS — M51.16 LUMBAR DISC HERNIATION WITH RADICULOPATHY: Primary | ICD-10-CM

## 2019-09-26 PROCEDURE — 99213 OFFICE O/P EST LOW 20 MIN: CPT | Performed by: PREVENTIVE MEDICINE

## 2019-09-26 RX ORDER — GABAPENTIN 300 MG/1
600 CAPSULE ORAL 3 TIMES DAILY
Qty: 180 CAPSULE | Refills: 1 | Status: SHIPPED | OUTPATIENT
Start: 2019-09-26 | End: 2019-12-11

## 2019-09-26 ASSESSMENT — PAIN SCALES - GENERAL: PAINLEVEL: NO PAIN (0)

## 2019-09-26 NOTE — PROGRESS NOTES
[facility] FOLLOW-UP VISIT 9/26/2019    Tracey Mandujano's chief complaint for this visit includes:  Chief Complaint   Patient presents with     RECHECK     f/u after NAVID, 9/12, doing great, no pain, stilling having issues with walking, started PT.      PCP: Galen Westfall    Referring Provider:  No referring provider defined for this encounter.        Interval History:     Follow up reason: after lumbar NAVID, 9/12 doing better    Following Therapeutic Plan: Yes     Pain: Improving    Function: Improving    Medical History:    Any recent changes to your medical history? No    Any new medication prescribed since last visit? No    Review of Systems:    Do you have fever, chills, weight loss? No    Do you have any vision problems? No    Do you have any chest pain or edema? No    Do you have any shortness of breath or wheezing?  No    Do you have stomach problems? No    Do you have any numbness or focal weakness? No    Do you have diabetes? Controlled     Do you have problems with bleeding or clotting? No    Do you have an rashes or other skin lesions? No

## 2019-09-26 NOTE — LETTER
9/26/2019         RE: Tracey Mandujano  [address] [place] [address]        Dear Colleague,    Thank you for referring your patient, Tracey Mandujano, to the [facility]. Please see a copy of my visit note below.          [facility] FOLLOW-UP VISIT 9/26/2019    Tracey Mandujano's chief complaint for this visit includes:  Chief Complaint   Patient presents with     RECHECK     f/u after NAVID, 9/12, doing great, no pain, stilling having issues with walking, started PT.      PCP: [facility]    Referring Provider:  No referring provider defined for this encounter.        Interval History:     Follow up reason: after lumbar NAVID, 9/12 doing better    Following Therapeutic Plan: Yes     Pain: Improving    Function: Improving    Medical History:    Any recent changes to your medical history? No    Any new medication prescribed since last visit? No    Review of Systems:    Do you have fever, chills, weight loss? No    Do you have any vision problems? No    Do you have any chest pain or edema? No    Do you have any shortness of breath or wheezing?  No    Do you have stomach problems? No    Do you have any numbness or focal weakness? No    Do you have diabetes? Controlled     Do you have problems with bleeding or clotting? No    Do you have an rashes or other skin lesions? No      HISTORY OF PRESENT ILLNESS  Ms. Mandujano is a pleasant 74 year old year old female who presents to clinic today for followup after injection in lumbar  Has a great deal of improvement in her pain in her low back that radiates into legs  Has some low back pain when she stands and walks for longer distances      MEDICAL HISTORY  There is no problem list on file for this patient.      Current Outpatient Medications   Medication Sig Dispense Refill     cholecalciferol 1000 units TABS Take 1,000 Units by mouth       citalopram (CELEXA) 20 MG tablet Take 20 mg by mouth daily  3     folic  acid (FOLVITE) 1 MG tablet        gabapentin (NEURONTIN) 300 MG capsule 1 cap daily X 1 wk, 1 cap twice daily X 1 wk, 1 cap tid.       gabapentin (NEURONTIN) 300 MG capsule Take 1 capsule (300 mg) by mouth 4 times daily 120 capsule 1     IRON PO        lisinopril (PRINIVIL,ZESTRIL) 5 MG tablet        methylPREDNISolone (MEDROL DOSEPAK) 4 MG tablet therapy pack Follow Package Directions 21 tablet 0     oxybutynin ER (DITROPAN-XL) 5 MG 24 hr tablet TAKE 1 TABLET BY MOUTH ONCE DAILY       simvastatin (ZOCOR) 20 MG tablet        tiZANidine (ZANAFLEX) 4 MG tablet Take 1-2 tablets (4-8 mg) by mouth nightly as needed 30 tablet 1     vitamin (B COMPLEX) tablet Take 1 tablet by mouth         No Known Allergies    No family history on file.    Additional medical/Social/Surgical histories reviewed in [facility] and updated as appropriate.     REVIEW OF SYSTEMS (9/26/2019)  10 point ROS of systems including Constitutional, Eyes, Respiratory, Cardiovascular, Gastroenterology, Genitourinary, Integumentary, Musculoskeletal, Psychiatric were all negative except for pertinent positives noted in my HPI.     PHYSICAL EXAM  VSS  General  - normal appearance, in no obvious distress  CV  - normal peripheral perfusion  Pulm  - normal respiratory pattern, non-labored  Musculoskeletal - lumbar spine  - stance: normal gait without limp, no obvious leg length discrepancy, normal heel and toe walk  - inspection: normal bone and joint alignment, no obvious scoliosis  - palpation: no paravertebral or bony tenderness  - ROM: flexion exacerbates some low back pain, normal extension, sidebending, rotation  - strength: lower extremities 5/5 in all planes  - special tests:  (+) straight leg raise  (+) slump test  Neuro  - patellar and Achilles DTRs 2+ bilaterally, NO lower extremity sensory deficit throughout L5 distribution, grossly normal coordination, normal muscle tone  Skin  - no ecchymosis, erythema, warmth, or induration, no obvious rash  Psych  -  interactive, appropriate, normal mood and affect  ASSESSMENT & PLAN  75 yo female with lumbar ddd, radicular pain, improved  Start PT  Cont. Gabapentin 600 tid  F/u PRN  Consider another NAVID    Tolu Pablo MD, CAQSM    Again, thank you for allowing me to participate in the care of your patient.        Sincerely,        Tolu Pablo MD

## 2019-09-26 NOTE — PROGRESS NOTES
HISTORY OF PRESENT ILLNESS  Ms. Mandujano is a pleasant 74 year old year old female who presents to clinic today for followup after injection in lumbar  Has a great deal of improvement in her pain in her low back that radiates into legs  Has some low back pain when she stands and walks for longer distances      MEDICAL HISTORY  There is no problem list on file for this patient.      Current Outpatient Medications   Medication Sig Dispense Refill     cholecalciferol 1000 units TABS Take 1,000 Units by mouth       citalopram (CELEXA) 20 MG tablet Take 20 mg by mouth daily  3     folic acid (FOLVITE) 1 MG tablet        gabapentin (NEURONTIN) 300 MG capsule 1 cap daily X 1 wk, 1 cap twice daily X 1 wk, 1 cap tid.       gabapentin (NEURONTIN) 300 MG capsule Take 1 capsule (300 mg) by mouth 4 times daily 120 capsule 1     IRON PO        lisinopril (PRINIVIL,ZESTRIL) 5 MG tablet        methylPREDNISolone (MEDROL DOSEPAK) 4 MG tablet therapy pack Follow Package Directions 21 tablet 0     oxybutynin ER (DITROPAN-XL) 5 MG 24 hr tablet TAKE 1 TABLET BY MOUTH ONCE DAILY       simvastatin (ZOCOR) 20 MG tablet        tiZANidine (ZANAFLEX) 4 MG tablet Take 1-2 tablets (4-8 mg) by mouth nightly as needed 30 tablet 1     vitamin (B COMPLEX) tablet Take 1 tablet by mouth         No Known Allergies    No family history on file.    Additional medical/Social/Surgical histories reviewed in [facility] and updated as appropriate.     REVIEW OF SYSTEMS (9/26/2019)  10 point ROS of systems including Constitutional, Eyes, Respiratory, Cardiovascular, Gastroenterology, Genitourinary, Integumentary, Musculoskeletal, Psychiatric were all negative except for pertinent positives noted in my HPI.     PHYSICAL EXAM  VSS  General  - normal appearance, in no obvious distress  CV  - normal peripheral perfusion  Pulm  - normal respiratory pattern, non-labored  Musculoskeletal - lumbar spine  - stance: normal gait without limp, no obvious leg length  discrepancy, normal heel and toe walk  - inspection: normal bone and joint alignment, no obvious scoliosis  - palpation: no paravertebral or bony tenderness  - ROM: flexion exacerbates some low back pain, normal extension, sidebending, rotation  - strength: lower extremities 5/5 in all planes  - special tests:  (+) straight leg raise  (+) slump test  Neuro  - patellar and Achilles DTRs 2+ bilaterally, NO lower extremity sensory deficit throughout L5 distribution, grossly normal coordination, normal muscle tone  Skin  - no ecchymosis, erythema, warmth, or induration, no obvious rash  Psych  - interactive, appropriate, normal mood and affect  ASSESSMENT & PLAN  73 yo female with lumbar ddd, radicular pain, improved  Start PT  Cont. Gabapentin 600 tid  F/u PRN  Consider another NAVID    Tolu Pablo MD, CAQSM

## 2019-10-17 DIAGNOSIS — M51.16 LUMBAR DISC HERNIATION WITH RADICULOPATHY: Primary | ICD-10-CM

## 2019-10-17 DIAGNOSIS — M51.369 DDD (DEGENERATIVE DISC DISEASE), LUMBAR: ICD-10-CM

## 2019-10-17 DIAGNOSIS — M48.061 SPINAL STENOSIS OF LUMBAR REGION, UNSPECIFIED WHETHER NEUROGENIC CLAUDICATION PRESENT: ICD-10-CM

## 2019-10-25 ENCOUNTER — TELEPHONE (OUTPATIENT)
Dept: ANESTHESIOLOGY | Facility: CLINIC | Age: 74
End: 2019-10-25

## 2019-10-25 NOTE — TELEPHONE ENCOUNTER
10/25 Called and left voicemail. Instructed patient to call [phone] to make an appointment with the pain clinic.     Hannah Parra   Procedure    Ortho/Sports Med/Ent/Eye   [facility]   [phone]

## 2019-10-25 NOTE — TELEPHONE ENCOUNTER
[facility] Call Center    Phone Message    May a detailed message be left on voicemail: yes    Reason for Call: Other: Please review order/referral in epic for Procedure only injection placed by Dr. Tolu Pablo.     Action Taken: Message routed to:  [facility] ump pain

## 2019-10-28 NOTE — TELEPHONE ENCOUNTER
10/28 Called and left voicemail. Instructed patient to call [phone] to make an appointment with the pain clinic.      Hannah Parra          Procedure    Ortho/Sports Med/Ent/Eye   [facility]   [phone]

## 2019-10-29 ENCOUNTER — TELEPHONE (OUTPATIENT)
Dept: ANESTHESIOLOGY | Facility: CLINIC | Age: 74
End: 2019-10-29

## 2019-10-30 NOTE — TELEPHONE ENCOUNTER
[facility] Call Center    Phone Message    May a detailed message be left on voicemail: yes    Reason for Call: Other: Mare is requesting a call back from Nettie salvador.      Action Taken: Message routed to:  [facility] Pain     DISCHARGE

## 2019-10-30 NOTE — TELEPHONE ENCOUNTER
I called and LVM for the pt informing her I was trying to set her up in clinic for an injection evaluation with one of our providers.    You can call me back at [phone].    Adela Dyer, CMA

## 2019-10-31 NOTE — TELEPHONE ENCOUNTER
I called and spoke with the pt and scheduled her for an injection evaluation with Dr. Sen on 11/4/19 at 11:30am.    Pt stated verbal understanding and had no further questions or concerns.    I did inform the pt that she will not be getting her injection the same day.    Adela Dyer, [facility]

## 2019-10-31 NOTE — TELEPHONE ENCOUNTER
See [employer] message, last epidural injection was 9/12/19 in L4-5. Last injection did not help legs, balance and pain.  Will send to Dr. Pablo to review if injection in different area would help or ablation would be better.  Whitley Stinson RN

## 2019-11-03 ASSESSMENT — ENCOUNTER SYMPTOMS
POLYDIPSIA: 0
ARTHRALGIAS: 0
DECREASED APPETITE: 0
ALTERED TEMPERATURE REGULATION: 0
SPEECH CHANGE: 0
DIZZINESS: 1
MEMORY LOSS: 0
LOSS OF CONSCIOUSNESS: 0
TREMORS: 1
NIGHT SWEATS: 0
FATIGUE: 1
MUSCLE CRAMPS: 0
WEAKNESS: 1
FEVER: 0
WEIGHT GAIN: 0
STIFFNESS: 0
DISTURBANCES IN COORDINATION: 0
POLYPHAGIA: 0
SEIZURES: 0
CHILLS: 0
JOINT SWELLING: 0
BACK PAIN: 1
TINGLING: 0
NECK PAIN: 1
MUSCLE WEAKNESS: 1
MYALGIAS: 1
INCREASED ENERGY: 1
NUMBNESS: 1
HALLUCINATIONS: 0
HEADACHES: 0

## 2019-11-03 ASSESSMENT — ANXIETY QUESTIONNAIRES
6. BECOMING EASILY ANNOYED OR IRRITABLE: NOT AT ALL
GAD7 TOTAL SCORE: 0
5. BEING SO RESTLESS THAT IT IS HARD TO SIT STILL: NOT AT ALL
2. NOT BEING ABLE TO STOP OR CONTROL WORRYING: NOT AT ALL
7. FEELING AFRAID AS IF SOMETHING AWFUL MIGHT HAPPEN: NOT AT ALL
4. TROUBLE RELAXING: NOT AT ALL
7. FEELING AFRAID AS IF SOMETHING AWFUL MIGHT HAPPEN: NOT AT ALL
3. WORRYING TOO MUCH ABOUT DIFFERENT THINGS: NOT AT ALL
GAD7 TOTAL SCORE: 0
1. FEELING NERVOUS, ANXIOUS, OR ON EDGE: NOT AT ALL

## 2019-11-04 ENCOUNTER — OFFICE VISIT (OUTPATIENT)
Dept: ANESTHESIOLOGY | Facility: CLINIC | Age: 74
End: 2019-11-04
Payer: MEDICARE

## 2019-11-04 VITALS
HEIGHT: 61 IN | DIASTOLIC BLOOD PRESSURE: 77 MMHG | BODY MASS INDEX: 33.99 KG/M2 | WEIGHT: 180 LBS | HEART RATE: 52 BPM | OXYGEN SATURATION: 98 % | RESPIRATION RATE: 16 BRPM | SYSTOLIC BLOOD PRESSURE: 114 MMHG

## 2019-11-04 DIAGNOSIS — M54.16 LUMBAR RADICULOPATHY: Primary | ICD-10-CM

## 2019-11-04 RX ORDER — DULOXETIN HYDROCHLORIDE 30 MG/1
60 CAPSULE, DELAYED RELEASE ORAL 2 TIMES DAILY
Qty: 60 CAPSULE | Refills: 0 | Status: SHIPPED | OUTPATIENT
Start: 2019-11-04 | End: 2019-12-09

## 2019-11-04 ASSESSMENT — ENCOUNTER SYMPTOMS
TREMORS: 1
DIZZINESS: 1
WEAKNESS: 1
CHILLS: 0
HEADACHES: 0
TINGLING: 0
MYALGIAS: 1
HALLUCINATIONS: 0
FEVER: 0
BACK PAIN: 1
SPEECH CHANGE: 0
POLYDIPSIA: 0
NECK PAIN: 1
SEIZURES: 0
LOSS OF CONSCIOUSNESS: 0
MEMORY LOSS: 0

## 2019-11-04 ASSESSMENT — MIFFLIN-ST. JEOR: SCORE: 1253.85

## 2019-11-04 ASSESSMENT — PAIN SCALES - GENERAL: PAINLEVEL: MODERATE PAIN (5)

## 2019-11-04 ASSESSMENT — ANXIETY QUESTIONNAIRES: GAD7 TOTAL SCORE: 0

## 2019-11-04 NOTE — PATIENT INSTRUCTIONS
1. Schedule procedure     2. Start taking cymbalta 30mg at bedtime for 7 days, then increase to 60mg at bedtime.         Follow up: 4 weeks after your procedure           To speak with a nurse, schedule/reschedule/cancel a clinic appointment, or request a medication refill call: [phone]     You can also reach us by MobileSnack: [email or web address]    For refills, please call on Monday, 1 week before your medication runs out. The doctors are not always in clinic, so this gives us time to get your prescriptions ready.  Please let us know the name of the medication you are requesting a refill of.           When calling to schedule your procedure appointment, also make your follow up clinic appointment 4 weeks after the procedure.    Please call [phone] to schedule, reschedule, or cancel your procedure appointment.   Phones are answered Monday - Friday from 7:30 - 4:00pm.  Leave a voicemail with your name, birth date, and phone number if no one is available to take your call.     Your procedure: Lumbar epidural steroid injection     On the day of the procedure  1. Arrive 1 hour earlier than your scheduled time, to the [facility]  Address: [address] [place] [id number]  2. Check in on the 5th floor for your procedure    If you must reschedule your procedure more than two times, you must follow up in clinic before rescheduling again.        Preparing for your procedure    CAUTION - FAILURE TO FOLLOW THESE PRE-PROCEDURE INSTRUCTIONS WILL RESULT IN YOUR PROCEDURE BEING RESCHEDULED.            You must have a  take you home after your procedure. Transportation by taxi or para-transit is okay as long as you have a responsible adult accompany you. You must provide your 's full name and contact number at time of check in.     Fasting Protocol You may have NOTHING SOLID TO EAT 8 HOURS prior to arrival at the procedure area.     You may have CLEAR LIQUIDS UP TO  2 HOURS prior to arrival.    Broth and candy are considered solid food and require an eight hour fast.     Clear liquids include water, clear fruit juice (no pulp), carbonated beverages, ice, black coffee, black tea, clear jello. No alcohol containing beverages.   Medications If you take any medications, DO NOT STOP. Take your medications as usual the day of your procedure with a sip of water AT LEAST 2 HOURS PRIOR TO ARRIVAL.    Antibiotics If you are currently taking antibiotics, you must complete the entire dose 7 days prior to your scheduled procedure. You must be clear of any signs or symptoms of infection. If you begin antibiotics, please contact our clinic for instructions.     Fever, Chills, or Rash If you experience a fever of higher than 100 degrees, chills, rash, or open wounds during the one week before your procedure, please call the clinic to see if you may proceed with your procedure.      Medication Hold List  **Patients under Cardiology/Neurology care should consult their provider prior to the pain procedure to verify pre-procedure medication instructions. The information below contains general guidelines.**    Blood Thinners If you are taking daily ASPIRIN, PLAVIX, OR OTHER BLOOD THINNERS SUCH AS COUMADIN/WARFARIN, we will need your prescribing doctor to sign a release permitting you to stop these medications. Once approved by your prescribing doctor - STOP ALL BLOOD THINNERS BASED ON THE TIME TABLE BELOW PRIOR TO YOUR PROCEDURE. If you have been instructed to stop WARFARIN(COUMADIN), you must have an INR lab drawn the day before your procedure. . Your INR must be within normal limits before we can perform your injection. MEDICATIONS CAN BE RESTARTED AFTER YOUR PROCEDURE.    14 DAY HOLD  Ticlid (ticlopidine)    10 DAY HOLD  Effient (Prasugel)    3 DAY HOLD  Xarelto (rivaroxaban) 7 DAY HOLD  Anacin, Bufferin, Ecotrin, Excedrin, Aggrenox (Aspirin)  Brilinta (ticagrelor)  Coumadin (Warfarin)  Pradexa  (Dabigatran)  Elmiron (Pentosan)  Plavix (Clopidogrel Bisulfate)  Pletal (Cilostazol)    24 HOUR HOLD  Lovenox (enoxaparin)  Agrylin (Anagrelide)                To speak with a nurse, schedule/reschedule/cancel a clinic appointment, or request a medication refill call: [phone]     You can also reach us by [employer]: [email or web address]

## 2019-11-04 NOTE — PROGRESS NOTES
"  Pain Clinic New Patient Consult Note:    Referring Provider: Self   Primary care provider: [facility].    HPI:  Tracey Mandujano is a 74 year old y.o. old female who presents to the pain clinic with back pain.  Back pain has been present since 2016.  She attributed it initially to multiple falls after tripping on a sidewalk and over a walker.  She describes it as an aching type pain.     Patient states that she has had leg pain for about a year now.  The pain is primarily in the left leg.  It goes down the entire leg and into the foot.  Both feet bother her as well. The pain in her feet are worse at night, but she does notice it during the day as well.  She does not have much pain down the right leg.  The pain is worse with sitting.  For this pain she has takes gabapentin 600mg TID. She has not noted any improvement with this.  She said that it makes her feel \"spacy\" at times. She takes tylenol and ibuprofen once in a while.  She had tizanidine prescribed but is unsure of whether it helps. She states that it may give her nightmares. She had an ILESI 9/2019 which helped her back pain. That gave her significant relief of her back pain but did not help her leg pain.     She had an EMG and per patient does not have neuropathy. She states that she was told she has radiculopathy.     Pain treatments:  Physical therapy: Currently participating in physical therapy  Chiropractor: Patient had 27 appointments with a chiropractor dating back to 2018 without relief of back pain  Surgery: N/A  Acupuncture: Tried acupuncture with minimal relief of pain  Medications: gabapentin 600 TID  Interventions: L4-5 ILESI 9/12/19    Tests/Imaging reviewed with the patient:  MRI lumbar spine 8/1/19 shows L4-5 advanced central stenosis and moderate bilateral neural foraminal stenosis, facet arthropathy most advanced at L4-5, L5-S1    Significant Medical History:   DM type 2    Past Surgical History:  No past surgical history " on file.       Family History:  Patient denies significant family history in first degree relatives    Social History:  Social History     Socioeconomic History     Marital status:      Spouse name: Not on file     Number of children: Not on file     Years of education: Not on file     Highest education level: Not on file   Occupational History     Not on file   Social Needs     Financial resource strain: Not on file     Food insecurity:     Worry: Not on file     Inability: Not on file     Transportation needs:     Medical: Not on file     Non-medical: Not on file   Tobacco Use     Smoking status: Former Smoker     Smokeless tobacco: Never Used   Substance and Sexual Activity     Alcohol use: Not on file     Drug use: Not on file     Sexual activity: Not on file   Lifestyle     Physical activity:     Days per week: Not on file     Minutes per session: Not on file     Stress: Not on file   Relationships     Social connections:     Talks on phone: Not on file     Gets together: Not on file     Attends Islam service: Not on file     Active member of club or organization: Not on file     Attends meetings of clubs or organizations: Not on file     Relationship status: Not on file     Intimate partner violence:     Fear of current or ex partner: Not on file     Emotionally abused: Not on file     Physically abused: Not on file     Forced sexual activity: Not on file   Other Topics Concern     Not on file   Social History Narrative     Not on file     Social History     Patient does not qualify to have social determinant information on file (likely too young).   Social History Narrative     Not on file          Allergies:  Allergies   Allergen Reactions     Hydromorphone Other (See Comments)       Current Medications:   Current Outpatient Medications   Medication Sig Dispense Refill     cholecalciferol 1000 units TABS Take 1,000 Units by mouth       citalopram (CELEXA) 20 MG tablet Take 20 mg by mouth daily  3  "    DULoxetine (CYMBALTA) 30 MG capsule Take 2 capsules (60 mg) by mouth 2 times daily Start 1 cap at night for a week then increase to 2 capsules at night. 60 capsule 0     folic acid (FOLVITE) 1 MG tablet        gabapentin (NEURONTIN) 300 MG capsule Take 2 capsules (600 mg) by mouth 3 times daily 180 capsule 1     IRON PO        lisinopril (PRINIVIL,ZESTRIL) 5 MG tablet        methylPREDNISolone (MEDROL DOSEPAK) 4 MG tablet therapy pack Follow Package Directions 21 tablet 0     oxybutynin ER (DITROPAN-XL) 5 MG 24 hr tablet TAKE 1 TABLET BY MOUTH ONCE DAILY       simvastatin (ZOCOR) 20 MG tablet        vitamin (B COMPLEX) tablet Take 1 tablet by mouth       tiZANidine (ZANAFLEX) 4 MG tablet Take 1-2 tablets (4-8 mg) by mouth nightly as needed (Patient not taking: Reported on 11/4/2019) 30 tablet 1          Current Pain Medications:  Medications related to Pain Management (From now, onward)    None           Past Pain Medications:  Gabapentin 600 TID (makes her \"spacy\" at times)  Tizanidine 4mg PRN (caused nightmares)    Blood thinner:  N/A    Current work status: Retired    Psychosocial History:   History of treatment for behavioral disorder: Denies  History of suicidal ideation or suicidal attempt: Denies    Review of Systems:  Review of Systems   Constitutional: Negative for chills and fever.   Musculoskeletal: Positive for back pain, myalgias and neck pain.   Neurological: Positive for dizziness, tremors and weakness. Negative for tingling, speech change, seizures, loss of consciousness and headaches.   Endo/Heme/Allergies: Negative for polydipsia.   Psychiatric/Behavioral: Negative for hallucinations and memory loss.       Answers for HPI/ROS submitted by the patient on 11/3/2019   MARIA GUADALUPE 7 TOTAL SCORE: 0  General Symptoms: Yes  Skin Symptoms: No  HENT Symptoms: No  EYE SYMPTOMS: No  HEART SYMPTOMS: No  LUNG SYMPTOMS: No  INTESTINAL SYMPTOMS: No  URINARY SYMPTOMS: No  GYNECOLOGIC SYMPTOMS: No  BREAST SYMPTOMS: " "No  SKELETAL SYMPTOMS: Yes  BLOOD SYMPTOMS: No  NERVOUS SYSTEM SYMPTOMS: Yes  MENTAL HEALTH SYMPTOMS: No  Loss of appetite: No  Weight gain: No  Fatigue: Yes  Night sweats: No  Increased stress: No  Excessive hunger: No  Feeling hot or cold when others believe the temperature is normal: No  Loss of height: No  Post-operative complications: No  Surgical site pain: No  Change in or Loss of Energy: Yes  Hyperactivity: No  Confusion: No  Swollen joints: No  Joint pain: No  Bone pain: Yes  Muscle cramps: No  Muscle weakness: Yes  Joint stiffness: No  Bone fracture: No  Trouble with coordination: No  Difficulty walking: Yes  Numbness: Yes      Physical Exam:     Vitals:    11/04/19 1130   BP: 114/77   Pulse: 52   Resp: 16   SpO2: 98%   Weight: 81.6 kg (180 lb)   Height: 1.549 m (5' 1\")       General Appearance: No distress, seated comfortably  Mood: Euthymic  HE ENT: Non constricted pupils  Respiratory: Non labored breathing  CVS: Regular rate and rhythm  GI: Soft, non distended, no TTP  Skin: No rashes over exposed skin  MS: Tenderness to palpation along bilateral lower lumbar paraspinal musculature.  Neuro: 5/5 through L2-S1 myotomes bilaterally  Diminished sensation to light touch in b/l feet below distal midfood  Diminished sensation to left lateral ankle and first webspace  Gait: Non-antalgic, ambulates without assistance      Laboratory results:  Recent Labs   Lab Test 03/12/18  1010      POTASSIUM 4.0   CHLORIDE 105   CO2 24   ANIONGAP 6   GLC 99   BUN 35*   CR 0.73   RAFAEL 10.1       CBC RESULTS:   Recent Labs   Lab Test 03/12/18  1010   WBC 6.0   RBC 4.24   HGB 12.6   HCT 39.0   MCV 92   MCH 29.7   MCHC 32.3   RDW 13.4            Imaging:       ASSESSMENT AND PLAN:     Encounter Diagnosis:    1. Lumbar radiculopathy  2. Lumbar facet mediated pain  3. Peripheral neuropathic pain    Tracey Mandujano is a 74 year old y.o. old female who presents to the pain clinic with low back pain, left leg pain and " bilateral foot pain.  Her low back pain and left leg pain is likely due to L4,5 lumbar radiculopathy.  She likely also has a component of facet mediated pain causing axial low back pain.  Her distal extremity pain is likely due to peripheral neuropathy given the distal and symmetric presentation as well as history of diabetes.      I have summarized the patient history, discussed their clinical findings and differential diagnosis with the patient. I have discussed anatomy and possible sources of the pain using models and/or pictures(diagrams). I have discussed multi- disciplinary pain management options with the patient as pertaining to their case as detailed above. All questions and concerns were answered to the best of my ability.     RECOMMENDATIONS:     1. Medications: We are prescribing the patient cymbalta 30mg nightly with titration up to 60mg if tolerated without side effects. Dosing, side effects, risks/benefits/alternatives were discussed with the patient in detail.  Continue with gabapentin 600mg TID for now with eventual plan of titrating down in the future.    2. Procedure: We are scheduling the patient for L5-S1 interlaminar epidural steroid injection in the procedure suite. Risks/benefits/alternatives were discussed.  For low back pain, we can consider medial branch blocks and eventual radiofrequency ablation thereafter. Will re-assess for this at a later date.  Patient was given information about spinal cord stimulation for neuropathic pain.  Will discuss this in the future in more detail if pain is refractory to more conservative measures.     The possible risks involved with inerventional treatment are as following but not limited to- no pain control, worsened pain, stroke, seizure, spinal headache, allergic reactions, introduction of infection or bleeding which may lead to emergent spine surgery, nerve damage, paralysis or even death.    3. Physical therapy: Continue with course of physical  therapy consisting of core strengthening, paraspinal stretching.  The importance of a home exercise program was stressed to the patient as well.    4. Patient was seen, examined and management discussed with attending physician Dr. Sen    Follow up: 4 weeks in clinic after epidural steroid injection    ATTENDING ATTESTATION    I saw the patient with Dr. Alessandro Cabrera pain medicine fellow. I was involved in history taking, physical examination and formulating a plan of care. Please refer to his note above for full details. I have reviewed the note and agree with it.

## 2019-11-04 NOTE — LETTER
Date:November 6, 2019      Patient was self referred, no letter generated. Do not send.        [facility] Information

## 2019-11-04 NOTE — LETTER
"11/4/2019       RE: Tracey Mandujano  [address] [place] [address]     Dear Colleague,    Thank you for referring your patient, Tracey Mandujano, to the [facility] COM[facility]HENSIVE PAIN MANAGEMENT at [facility]. Please see a copy of my visit note below.      Pain Clinic New Patient Consult Note:    Referring Provider: Self   Primary care provider: [facility], [facility].    HPI:  Tracey Mandujano is a 74 year old y.o. old female who presents to the pain clinic with back pain.  Back pain has been present since 2016.  She attributed it initially to multiple falls after tripping on a sidewalk and over a walker.  She describes it as an aching type pain.     Patient states that she has had leg pain for about a year now.  The pain is primarily in the left leg.  It goes down the entire leg and into the foot.  Both feet bother her as well. The pain in her feet are worse at night, but she does notice it during the day as well.  She does not have much pain down the right leg.  The pain is worse with sitting.  For this pain she has takes gabapentin 600mg TID. She has not noted any improvement with this.  She said that it makes her feel \"spacy\" at times. She takes tylenol and ibuprofen once in a while.  She had tizanidine prescribed but is unsure of whether it helps. She states that it may give her nightmares. She had an ILESI 9/2019 which helped her back pain. That gave her significant relief of her back pain but did not help her leg pain.     She had an EMG and per patient does not have neuropathy. She states that she was told she has radiculopathy.     Pain treatments:  Physical therapy: Currently participating in physical therapy  Chiropractor: Patient had 27 appointments with a chiropractor dating back to 2018 without relief of back pain  Surgery: N/A  Acupuncture: Tried acupuncture with minimal relief of pain  Medications: gabapentin 600 TID  Interventions: " L4-5 ILESI 9/12/19    Tests/Imaging reviewed with the patient:  MRI lumbar spine 8/1/19 shows L4-5 advanced central stenosis and moderate bilateral neural foraminal stenosis, facet arthropathy most advanced at L4-5, L5-S1    Significant Medical History:   DM type 2    Past Surgical History:  No past surgical history on file.       Family History:  Patient denies significant family history in first degree relatives    Social History:  Social History     Socioeconomic History     Marital status:      Spouse name: Not on file     Number of children: Not on file     Years of education: Not on file     Highest education level: Not on file   Occupational History     Not on file   Social Needs     Financial resource strain: Not on file     Food insecurity:     Worry: Not on file     Inability: Not on file     Transportation needs:     Medical: Not on file     Non-medical: Not on file   Tobacco Use     Smoking status: Former Smoker     Smokeless tobacco: Never Used   Substance and Sexual Activity     Alcohol use: Not on file     Drug use: Not on file     Sexual activity: Not on file   Lifestyle     Physical activity:     Days per week: Not on file     Minutes per session: Not on file     Stress: Not on file   Relationships     Social connections:     Talks on phone: Not on file     Gets together: Not on file     Attends Yarsani service: Not on file     Active member of club or organization: Not on file     Attends meetings of clubs or organizations: Not on file     Relationship status: Not on file     Intimate partner violence:     Fear of current or ex partner: Not on file     Emotionally abused: Not on file     Physically abused: Not on file     Forced sexual activity: Not on file   Other Topics Concern     Not on file   Social History Narrative     Not on file     Social History     Patient does not qualify to have social determinant information on file (likely too young).   Social History Narrative     Not on  "file          Allergies:  Allergies   Allergen Reactions     Hydromorphone Other (See Comments)       Current Medications:   Current Outpatient Medications   Medication Sig Dispense Refill     cholecalciferol 1000 units TABS Take 1,000 Units by mouth       citalopram (CELEXA) 20 MG tablet Take 20 mg by mouth daily  3     DULoxetine (CYMBALTA) 30 MG capsule Take 2 capsules (60 mg) by mouth 2 times daily Start 1 cap at night for a week then increase to 2 capsules at night. 60 capsule 0     folic acid (FOLVITE) 1 MG tablet        gabapentin (NEURONTIN) 300 MG capsule Take 2 capsules (600 mg) by mouth 3 times daily 180 capsule 1     IRON PO        lisinopril (PRINIVIL,ZESTRIL) 5 MG tablet        methylPREDNISolone (MEDROL DOSEPAK) 4 MG tablet therapy pack Follow Package Directions 21 tablet 0     oxybutynin ER (DITROPAN-XL) 5 MG 24 hr tablet TAKE 1 TABLET BY MOUTH ONCE DAILY       simvastatin (ZOCOR) 20 MG tablet        vitamin (B COMPLEX) tablet Take 1 tablet by mouth       tiZANidine (ZANAFLEX) 4 MG tablet Take 1-2 tablets (4-8 mg) by mouth nightly as needed (Patient not taking: Reported on 11/4/2019) 30 tablet 1          Current Pain Medications:  Medications related to Pain Management (From now, onward)    None           Past Pain Medications:  Gabapentin 600 TID (makes her \"spacy\" at times)  Tizanidine 4mg PRN (caused nightmares)    Blood thinner:  N/A    Current work status: Retired    Psychosocial History:   History of treatment for behavioral disorder: Denies  History of suicidal ideation or suicidal attempt: Denies    Review of Systems:  Review of Systems   Constitutional: Negative for chills and fever.   Musculoskeletal: Positive for back pain, myalgias and neck pain.   Neurological: Positive for dizziness, tremors and weakness. Negative for tingling, speech change, seizures, loss of consciousness and headaches.   Endo/Heme/Allergies: Negative for polydipsia.   Psychiatric/Behavioral: Negative for " "hallucinations and memory loss.       Answers for HPI/ROS submitted by the patient on 11/3/2019   MARIA GUADALUPE 7 TOTAL SCORE: 0  General Symptoms: Yes  Skin Symptoms: No  HENT Symptoms: No  EYE SYMPTOMS: No  HEART SYMPTOMS: No  LUNG SYMPTOMS: No  INTESTINAL SYMPTOMS: No  URINARY SYMPTOMS: No  GYNECOLOGIC SYMPTOMS: No  BREAST SYMPTOMS: No  SKELETAL SYMPTOMS: Yes  BLOOD SYMPTOMS: No  NERVOUS SYSTEM SYMPTOMS: Yes  MENTAL HEALTH SYMPTOMS: No  Loss of appetite: No  Weight gain: No  Fatigue: Yes  Night sweats: No  Increased stress: No  Excessive hunger: No  Feeling hot or cold when others believe the temperature is normal: No  Loss of height: No  Post-operative complications: No  Surgical site pain: No  Change in or Loss of Energy: Yes  Hyperactivity: No  Confusion: No  Swollen joints: No  Joint pain: No  Bone pain: Yes  Muscle cramps: No  Muscle weakness: Yes  Joint stiffness: No  Bone fracture: No  Trouble with coordination: No  Difficulty walking: Yes  Numbness: Yes      Physical Exam:     Vitals:    11/04/19 1130   BP: 114/77   Pulse: 52   Resp: 16   SpO2: 98%   Weight: 81.6 kg (180 lb)   Height: 1.549 m (5' 1\")       General Appearance: No distress, seated comfortably  Mood: Euthymic  HE ENT: Non constricted pupils  Respiratory: Non labored breathing  CVS: Regular rate and rhythm  GI: Soft, non distended, no TTP  Skin: No rashes over exposed skin  MS: Tenderness to palpation along bilateral lower lumbar paraspinal musculature.  Neuro: 5/5 through L2-S1 myotomes bilaterally  Diminished sensation to light touch in b/l feet below distal midfood  Diminished sensation to left lateral ankle and first webspace  Gait: Non-antalgic, ambulates without assistance      Laboratory results:  Recent Labs   Lab Test 03/12/18  1010      POTASSIUM 4.0   CHLORIDE 105   CO2 24   ANIONGAP 6   GLC 99   BUN 35*   CR 0.73   RAFAEL 10.1       CBC RESULTS:   Recent Labs   Lab Test 03/12/18  1010   WBC 6.0   RBC 4.24   HGB 12.6   HCT 39.0   MCV 92 "   MCH 29.7   MCHC 32.3   RDW 13.4            Imaging:       ASSESSMENT AND PLAN:     Encounter Diagnosis:    1. Lumbar radiculopathy  2. Lumbar facet mediated pain  3. Peripheral neuropathic pain    Tracey Mandujano is a 74 year old y.o. old female who presents to the pain clinic with low back pain, left leg pain and bilateral foot pain.  Her low back pain and left leg pain is likely due to L4,5 lumbar radiculopathy.  She likely also has a component of facet mediated pain causing axial low back pain.  Her distal extremity pain is likely due to peripheral neuropathy given the distal and symmetric presentation as well as history of diabetes.      I have summarized the patient history, discussed their clinical findings and differential diagnosis with the patient. I have discussed anatomy and possible sources of the pain using models and/or pictures(diagrams). I have discussed multi- disciplinary pain management options with the patient as pertaining to their case as detailed above. All questions and concerns were answered to the best of my ability.     RECOMMENDATIONS:     1. Medications: We are prescribing the patient cymbalta 30mg nightly with titration up to 60mg if tolerated without side effects. Dosing, side effects, risks/benefits/alternatives were discussed with the patient in detail.  Continue with gabapentin 600mg TID for now with eventual plan of titrating down in the future.    2. Procedure: We are scheduling the patient for L5-S1 interlaminar epidural steroid injection in the procedure suite. Risks/benefits/alternatives were discussed.  For low back pain, we can consider medial branch blocks and eventual radiofrequency ablation thereafter. Will re-assess for this at a later date.  Patient was given information about spinal cord stimulation for neuropathic pain.  Will discuss this in the future in more detail if pain is refractory to more conservative measures.     The possible risks involved with  inerventional treatment are as following but not limited to- no pain control, worsened pain, stroke, seizure, spinal headache, allergic reactions, introduction of infection or bleeding which may lead to emergent spine surgery, nerve damage, paralysis or even death.    3. Physical therapy: Continue with course of physical therapy consisting of core strengthening, paraspinal stretching.  The importance of a home exercise program was stressed to the patient as well.    4. Patient was seen, examined and management discussed with attending physician Dr. Sen    Follow up: 4 weeks in clinic after epidural steroid injection    ATTENDING ATTESTATION    I saw the patient with Dr. Alessandro Cabrera pain medicine fellow. I was involved in history taking, physical examination and formulating a plan of care. Please refer to his note above for full details. I have reviewed the note and agree with it.                                                                                               Again, thank you for allowing me to participate in the care of your patient.      Sincerely,    Su Sen MD

## 2019-11-08 ENCOUNTER — TELEPHONE (OUTPATIENT)
Dept: ANESTHESIOLOGY | Facility: CLINIC | Age: 74
End: 2019-11-08

## 2019-11-08 NOTE — TELEPHONE ENCOUNTER
I leftv  for the pt informing her that if she would prefer to have her procedure done in [place] with Dr. Live she should contact Dr. Pablo's office and request that he put in a procedure order with Dr. Live.    If you have any further questions or concerns you can call us back at [phone].

## 2019-12-09 ENCOUNTER — TELEPHONE (OUTPATIENT)
Dept: ANESTHESIOLOGY | Facility: CLINIC | Age: 74
End: 2019-12-09

## 2019-12-09 DIAGNOSIS — M54.16 LUMBAR RADICULOPATHY: ICD-10-CM

## 2019-12-09 RX ORDER — DULOXETIN HYDROCHLORIDE 30 MG/1
60 CAPSULE, DELAYED RELEASE ORAL 2 TIMES DAILY
Qty: 60 CAPSULE | Refills: 1 | Status: SHIPPED | OUTPATIENT
Start: 2019-12-09 | End: 2020-01-17

## 2019-12-09 NOTE — TELEPHONE ENCOUNTER
[facility] Call Center    Phone Message    May a detailed message be left on voicemail: yes    Reason for Call: Medication Refill Request    Has the patient contacted the pharmacy for the refill? Yes   Name of medication being requested: Duloxetine  Provider who prescribed the medication:   Pharmacy: [facility] [place]   Date medication is needed: 12/09/2019  Pt called for refill and pharmacy stated they faxed over paper work regarding medication refill and neither have heard back yet. Pt would like a call back when prescription is filled         Action Taken: Message routed to:  [facility] ([facility] Pain

## 2019-12-09 NOTE — TELEPHONE ENCOUNTER
I called and spoke with the pt and informed her that we sent in her refills to the pharmacy.    Pt stated verbal understanding and had no further questions or concerns.    Adela Dyer, ALICIA

## 2019-12-11 DIAGNOSIS — M51.16 LUMBAR DISC HERNIATION WITH RADICULOPATHY: ICD-10-CM

## 2019-12-15 ENCOUNTER — ANESTHESIA EVENT (OUTPATIENT)
Dept: SURGERY | Facility: AMBULATORY SURGERY CENTER | Age: 74
End: 2019-12-15

## 2019-12-16 ENCOUNTER — ANESTHESIA (OUTPATIENT)
Dept: SURGERY | Facility: AMBULATORY SURGERY CENTER | Age: 74
End: 2019-12-16

## 2019-12-16 ENCOUNTER — ANCILLARY PROCEDURE (OUTPATIENT)
Dept: RADIOLOGY | Facility: AMBULATORY SURGERY CENTER | Age: 74
End: 2019-12-16
Attending: ANESTHESIOLOGY
Payer: MEDICARE

## 2019-12-16 ENCOUNTER — HOSPITAL ENCOUNTER (OUTPATIENT)
Facility: AMBULATORY SURGERY CENTER | Age: 74
End: 2019-12-16
Attending: ANESTHESIOLOGY
Payer: MEDICARE

## 2019-12-16 VITALS
HEART RATE: 57 BPM | WEIGHT: 180 LBS | SYSTOLIC BLOOD PRESSURE: 136 MMHG | BODY MASS INDEX: 33.99 KG/M2 | DIASTOLIC BLOOD PRESSURE: 65 MMHG | TEMPERATURE: 97.1 F | OXYGEN SATURATION: 100 % | HEIGHT: 61 IN | RESPIRATION RATE: 16 BRPM

## 2019-12-16 DIAGNOSIS — M54.16 LUMBAR RADICULOPATHY: ICD-10-CM

## 2019-12-16 RX ORDER — LIDOCAINE HYDROCHLORIDE 10 MG/ML
INJECTION, SOLUTION EPIDURAL; INFILTRATION; INTRACAUDAL; PERINEURAL PRN
Status: DISCONTINUED | OUTPATIENT
Start: 2019-12-16 | End: 2019-12-16 | Stop reason: HOSPADM

## 2019-12-16 RX ORDER — METHYLPREDNISOLONE ACETATE 40 MG/ML
INJECTION, SUSPENSION INTRA-ARTICULAR; INTRALESIONAL; INTRAMUSCULAR; SOFT TISSUE PRN
Status: DISCONTINUED | OUTPATIENT
Start: 2019-12-16 | End: 2019-12-16 | Stop reason: HOSPADM

## 2019-12-16 RX ORDER — GABAPENTIN 300 MG/1
CAPSULE ORAL
Qty: 180 CAPSULE | Refills: 1 | Status: SHIPPED | OUTPATIENT
Start: 2019-12-16 | End: 2020-02-20

## 2019-12-16 RX ORDER — BUPIVACAINE HYDROCHLORIDE 2.5 MG/ML
INJECTION, SOLUTION EPIDURAL; INFILTRATION; INTRACAUDAL PRN
Status: DISCONTINUED | OUTPATIENT
Start: 2019-12-16 | End: 2019-12-16 | Stop reason: HOSPADM

## 2019-12-16 RX ORDER — IOPAMIDOL 408 MG/ML
INJECTION, SOLUTION INTRATHECAL PRN
Status: DISCONTINUED | OUTPATIENT
Start: 2019-12-16 | End: 2019-12-16 | Stop reason: HOSPADM

## 2019-12-16 ASSESSMENT — MIFFLIN-ST. JEOR: SCORE: 1253.85

## 2019-12-16 NOTE — PROCEDURES
Lumbar Epidural Steroid Injection    Procedure:  1. Lumbar epidural steroid injection at left L5-S1  2. Fluoroscopy for needle guidance    Pre-operative Diagnosis:  1. Intervertebral disc disorders w radiculopathy, lumbosacral region  2. Other intervertebral disc degeneration, lumbosacral region    Post-Operative Diagnosis:  1. Intervertebral disc disorders w radiculopathy, lumbosacral region  2. Other intervertebral disc degeneration, lumbosacral region    Anesthesia:  Local anesthesia    Medical Indications:  The patient presents to the ambulatory surgery center today for the treatment of persistent pain. We believe that the pain is spinally mediated. The patient has been exhibiting symptoms consistent with lumbar intraspinal inflammation and radiculopathy. Symptoms have been persistent, disabling and intermittently severe. The patient has been evaluated in the pain clinic and scheduled today for a spinal injection to aid in the diagnosis and treatment of presumed spinal pain. The risks, benefits, potential complications, and alternatives were discussed with the patient as per the signed consent form, and informed consent was obtained. The patient has received information about the procedure and its risks. The physician personally confirmed the procedure, side, and site to be injected with the patient and marked the site in the pre-procedure area.    Description of Procedure:  An additional intraoperative timeout, specifically to confirm accurate localization, was conducted by the attending physician. The patient remained awake and alert throughout the procedure. The patient was placed in the prone position. The skin was prepped with chlorhexidine and sterile drapes were applied. The skin and soft tissues were anesthetized with lidocaine 1%. A 22 gauge 3.5 inch touhy needle epidural needle was inserted percutaneously and advanced under fluoroscopic guidance using AP, and lateral projections. Using loss of  resistance to air and a left sided interlaminar approach, the L5/S1 posterior epidural space was entered after the lamina was contacted with the epidural needle. No paraesthesias occurred and aspiration was negative. Epidurography and radiographic interpretation: Epidurography was performed by injection of Isovue 200 under live fluoroscopy. Multiple Xray images were obtained. Radiologic examination confirmed proper spread of the contrast medium within the epidural space. There was no evidence of intrathecal or intravascular runoff. The needle was injected with 40mg methylprednisolone mixed with 3 ml of 0.25% bupivacaine. The needle was removed after flushing with 1% lidocaine. A sterile bandage was applied. The patient did not experience any hemodynamic or neurologic sequelae. The patient was transferred to the recovery area. Post operative instructions were explained and given to the patient.    Complications:  No procedural or immediate post-procedural complications occurred and the patient was transferred to PACU in stable condition.  Procedure Images:    Post-Operative Plan:  The patient will monitor pain relief from today's procedure. They will call the clinic for any problems related to today's procedure. A copy of our written post-procedure instructions was provided. They will return to clinic as scheduled.

## 2019-12-16 NOTE — H&P
Tracey MEDRANO Nazia  [id number]  female  74 year old      Reason for procedure/surgery: LESI    Patient Active Problem List   Diagnosis     Lumbar radiculopathy       Past Surgical History:    Past Surgical History:   Procedure Laterality Date     GI SURGERY      lap band     GYN SURGERY      hysterectomy     ORTHOPEDIC SURGERY      right shoulder replacement, left total knee replacement       Past Medical History:   Past Medical History:   Diagnosis Date     Hypertension        Social History:   Social History     Tobacco Use     Smoking status: Former Smoker     Smokeless tobacco: Never Used   Substance Use Topics     Alcohol use: Not Currently       Family History: History reviewed. No pertinent family history.    Allergies:   Allergies   Allergen Reactions     Hydromorphone Other (See Comments)       Active Medications:   Current Outpatient Medications   Medication Sig Dispense Refill     cholecalciferol 1000 units TABS Take 1,000 Units by mouth       citalopram (CELEXA) 20 MG tablet Take 20 mg by mouth daily  3     DULoxetine (CYMBALTA) 30 MG capsule Take 2 capsules (60 mg) by mouth 2 times daily Start 1 cap at night for a week then increase to 2 capsules at night. 60 capsule 1     folic acid (FOLVITE) 1 MG tablet        gabapentin (NEURONTIN) 300 MG capsule Take 2 capsules (600 mg) by mouth 3 times daily 180 capsule 1     IRON PO        lisinopril (PRINIVIL,ZESTRIL) 5 MG tablet        oxybutynin ER (DITROPAN-XL) 5 MG 24 hr tablet TAKE 1 TABLET BY MOUTH ONCE DAILY       simvastatin (ZOCOR) 20 MG tablet        vitamin (B COMPLEX) tablet Take 1 tablet by mouth         Systemic Review:   CONSTITUTIONAL: NEGATIVE for fever, chills, change in weight  ENT/MOUTH: NEGATIVE for ear, mouth and throat problems  RESP: NEGATIVE for significant cough or SOB  CV: NEGATIVE for chest pain, palpitations or peripheral edema    Physical Examination:   Vital Signs: BP (!) 149/73   Pulse 60   Temp 97.3  F (36.3  C) (Temporal)    "Resp 16   Ht 1.549 m (5' 1\")   Wt 81.6 kg (180 lb)   SpO2 98%   BMI 34.01 kg/m    GENERAL: healthy, alert and no distress  NECK: no adenopathy, no asymmetry, masses, or scars  RESP: lungs clear to auscultation - no rales, rhonchi or wheezes  CV: regular rate and rhythm, normal S1 S2, no S3 or S4, no murmur, click or rub, no peripheral edema and peripheral pulses strong  ABDOMEN: soft, nontender, no hepatosplenomegaly, no masses and bowel sounds normal  MS: no gross musculoskeletal defects noted, no edema    Plan: Appropriate to proceed as scheduled.      Su Sen MD  12/16/2019          "

## 2019-12-16 NOTE — DISCHARGE INSTRUCTIONS
Home Care Instructions after an Epidural Steroid Pain Injection    A lumbar epidural steroid injection delivers steroid medication directly into the area that may be causing your lower back pain and/or leg pain. A cervical or thoracic epidural steroid injection delivers steroids into the epidural space surrounding spinal nerve roots to help relieve pain in the upper spine/neck.    Activity  -Rest today  -Do not work today  -Resume normal activity tomorrow  -DO NOT shower for 24 hours  -DO NOT remove bandaid for 24 hours    Pain  -You may experience soreness at the injection site for one or two days  -You may use an ice pack for 20 minutes every 2 hours for the first 24 hours  -You may use a heating pad after the first 24 hours  -You may use Tylenol (acetaminophen) every 4 hours or other pain medicines as     directed by your physician    You may experience numbness radiating into your legs or arms (depending on the procedure location). This numbness may last several hours. Until sensation returns to normal; please use caution in walking, climbing stairs, and stepping out of your vehicle, etc.    DID YOU RECEIVE SEDATION TODAY?  No    Safety  Sedation medicine, if given, may remain active for many hours. It is important for the next 24 hours that you do not:  -Drive a car  -Operate machines or power tools  -Consume alcohol, including beer  -Sign any important papers or legal documents      Common side effects of steroids:  Not everyone will experience corticosteroid side effects. If side effects are experienced, they will gradually subside in the 7-10 day period following an injection. Most common side effects include:  -Flushed face and/or chest  -Feeling of warmth, particularly in the face but could be an overall feeling of warmth  -Increased blood sugar in diabetic patients  -Menstrual irregularities my occur. If taking hormone-based birth control an alternate method of birth control is recommended  -Sleep  disturbances and/or mood swings are possible  -Leg cramps    Please contact us if you have:  -Severe pain  -Fever more than 101.5 degrees Fahrenheit  -Signs of infection at the injection site (redness, swelling, or drainage)    If you have questions, please contact our office at [phone] between the hours of 7:00 am and 3:00 pm Monday through Friday. After office hours you can contact the on call provider by dialing [phone]. If you need immediate attention, we recommend that you go to a hospital emergency room or dial [phone].

## 2020-01-17 DIAGNOSIS — M54.16 LUMBAR RADICULOPATHY: ICD-10-CM

## 2020-01-17 RX ORDER — DULOXETIN HYDROCHLORIDE 30 MG/1
60 CAPSULE, DELAYED RELEASE ORAL 2 TIMES DAILY
Qty: 60 CAPSULE | Refills: 1 | Status: SHIPPED | OUTPATIENT
Start: 2020-01-17 | End: 2021-01-26

## 2020-01-27 ENCOUNTER — OFFICE VISIT (OUTPATIENT)
Dept: ANESTHESIOLOGY | Facility: CLINIC | Age: 75
End: 2020-01-27
Payer: MEDICARE

## 2020-01-27 VITALS
RESPIRATION RATE: 19 BRPM | WEIGHT: 176.6 LBS | DIASTOLIC BLOOD PRESSURE: 84 MMHG | HEART RATE: 67 BPM | SYSTOLIC BLOOD PRESSURE: 127 MMHG | BODY MASS INDEX: 33.34 KG/M2 | TEMPERATURE: 97.8 F | HEIGHT: 61 IN | OXYGEN SATURATION: 99 %

## 2020-01-27 DIAGNOSIS — M54.16 LUMBAR RADICULOPATHY: Primary | ICD-10-CM

## 2020-01-27 RX ORDER — DULOXETIN HYDROCHLORIDE 30 MG/1
60 CAPSULE, DELAYED RELEASE ORAL DAILY
Qty: 60 CAPSULE | Refills: 3 | Status: SHIPPED | OUTPATIENT
Start: 2020-01-27 | End: 2020-09-08

## 2020-01-27 ASSESSMENT — ENCOUNTER SYMPTOMS: BACK PAIN: 1

## 2020-01-27 ASSESSMENT — MIFFLIN-ST. JEOR: SCORE: 1238.43

## 2020-01-27 ASSESSMENT — PAIN SCALES - GENERAL: PAINLEVEL: MODERATE PAIN (4)

## 2020-01-27 NOTE — PATIENT INSTRUCTIONS
Medications:    Cymbalta Refilled- Continue as prescribed.   Please obtain refills from your primary care provider.     Procedures:    Call our office if you would like a repeat injection.     Referrals:    External Physical Therapy Referral Placed.   Contact your insurance regarding approved locations.     Recommended Follow up:  As needed.         To speak with a nurse, schedule/reschedule/cancel a clinic appointment, or request a medication refill call: [phone]     You can also reach us by [employer]: [email or web address]

## 2020-01-27 NOTE — LETTER
1/27/2020       RE: Tracey Mandujano  [address] [place] [address]     Dear Colleague,    Thank you for referring your patient, Tracey Mandujano, to the [facility] FOR COMPREHENSIVE PAIN MANAGEMENT at [facility] Please see a copy of my visit note below.    Pain clinic follow up note    HPI:   Tracey Mandujano is a 74 year old year old female  who presents to the pain clinic with chronic axial low back and left leg pain as well as length dependent peripheral neuropathy, now s/p L5-S1 left paramedian interlaminar epidural steroid injection on 12/16/2019.    Patient Supplied Answers To the [facility] Pain Questionnaire  [facility] -  Patient Entered Questionnaire/Answers 11/3/2019   What number best describes your pain right now:  0 = No pain  to  10 = Worst pain imaginable 7   How would you describe the pain? sharp, numbness, dull, aching, pressure   Which of the following worsen your pain? standing, walking   Which of the following improve or reduce your pain?  lying down   What number best describes your average pain for the past week:  0 = No pain  to  10 = Worst pain imaginable 7   What number best describes your LOWEST pain in past 24 hours:  0 = No pain  to  10 = Worst pain imaginable 3   What number best describes your WORST pain in past 24 hours:  0 = No pain  to  10 = Worst pain imaginable 7   When is your pain worst? Constant   What non-medicine treatments have you already had for your pain? physical therapy, acupuncture, chiropractic care, spine injections (shots)   Have you tried treating your pain with medication?  Yes   Are you currently taking medications for your pain? Yes       Pain today is 2/10    Patient reports compared to the last visit their pain is improved by following percent:    Pain intensity: 45 %  Pain frequency: 50 %  Duration of pain episodes: 45 %  Falling asleep: no change, wasn't problematic prior to the injection  Staying asleep: no  "change  Ability to work: back is much better, able to play cards with friends now  Ability to exercise: 25 %  Performing chores: 50 %, stamina is not good, she thinks this is due to both deconditioning as well as peripheral neuropathy and associated balance difficulties.    She states that she is now better able to complete household chores as well as socialize with friends, however now she feels limited by \"stamina\".  She states she gets axial low back pain that radiates to her thoracic spine as well as left shoulder with activities.  She attributes this to deconditioning as a result of decreased activities due to her pain.  She participated in physical therapy in October and November 2019, however stopped participating due to a feeling that she was not optimizing the therapy due to pain.       ROS:  Review of Systems   Musculoskeletal: Positive for back pain.         Significant Medical History:   Past Medical History:   Diagnosis Date     Hypertension           Past Surgical History:  Past Surgical History:   Procedure Laterality Date     GI SURGERY      lap band     GYN SURGERY      hysterectomy     INJECT EPIDURAL LUMBAR / SACRAL SINGLE Left 12/16/2019    Procedure: Left Lumbar 5-sacral 1 interlaminar epidural steroid injection with fluoroscopy;  Surgeon: Su Sen MD;  [facility]     ORTHOPEDIC SURGERY      right shoulder replacement, left total knee replacement          Family History:  Reviewed and noncontributory    Social History:  Social History     Socioeconomic History     Marital status:      Spouse name: Not on file     Number of children: Not on file     Years of education: Not on file     Highest education level: Not on file   Occupational History     Not on file   Social Needs     Financial resource strain: Not on file     Food insecurity:     Worry: Not on file     Inability: Not on file     Transportation needs:     Medical: Not on file     Non-medical: Not on file   Tobacco Use "     Smoking status: Former Smoker     Smokeless tobacco: Never Used   Substance and Sexual Activity     Alcohol use: Not Currently     Drug use: Not on file     Sexual activity: Not on file   Lifestyle     Physical activity:     Days per week: Not on file     Minutes per session: Not on file     Stress: Not on file   Relationships     Social connections:     Talks on phone: Not on file     Gets together: Not on file     Attends Yazidi service: Not on file     Active member of club or organization: Not on file     Attends meetings of clubs or organizations: Not on file     Relationship status: Not on file     Intimate partner violence:     Fear of current or ex partner: Not on file     Emotionally abused: Not on file     Physically abused: Not on file     Forced sexual activity: Not on file   Other Topics Concern     Not on file   Social History Narrative     Not on file     Social History     Social History Narrative     Not on file          Allergies:  Allergies   Allergen Reactions     Hydromorphone Other (See Comments)     Mental change, hallucinations        Current Medications:   Current Outpatient Medications   Medication Sig Dispense Refill     cholecalciferol 1000 units TABS Take 1,000 Units by mouth every morning        citalopram (CELEXA) 20 MG tablet Take 20 mg by mouth daily  3     DULoxetine (CYMBALTA) 30 MG capsule Take 2 capsules (60 mg) by mouth 2 times daily Start 1 cap at night for a week then increase to 2 capsules at night. (Patient taking differently: Take 60 mg by mouth At Bedtime ) 60 capsule 1     folic acid (FOLVITE) 1 MG tablet Take 1 mg by mouth every morning        gabapentin (NEURONTIN) 300 MG capsule TAKE 2 CAPSULES BY MOUTH THREE TIMES DAILY 180 capsule 1     lisinopril (PRINIVIL,ZESTRIL) 5 MG tablet Take 5 mg by mouth every morning        Multiple Vitamins-Minerals (MULTIVITAMIN WOMEN PO) Take 1 capsule by mouth every morning       oxybutynin ER (DITROPAN-XL) 5 MG 24 hr tablet Take  "5 mg by mouth every morning        simvastatin (ZOCOR) 20 MG tablet Take 20 mg by mouth At Bedtime        vitamin (B COMPLEX) tablet Take 1 tablet by mouth every morning             Current Pain Medications:    Gabapentin 600 mg 3 times daily  Duloxetine 60 mg daily    Physical Exam:     /84   Pulse 67   Temp 97.8  F (36.6  C) (Oral)   Resp 19   Ht 1.549 m (5' 1\")   Wt 80.1 kg (176 lb 9.6 oz)   SpO2 99%   BMI 33.37 kg/m       General Appearance: No distress, seated comfortably  Mood: Euthymic  HE ENT: Non constricted pupils  Respiratory: Non labored breathing  CVS: Regular rate and rhythm  GI: Soft, non distended, no TTP  Skin: No rashes over exposed skin  MS: Normal muscle bulk  Neuro: Cranial nerves 2-12 intact  Gait: non antalgic, ambulates with out assistance    Pain specific exam:    Tenderness to palpation over bilateral lumbar paraspinal muscles/facet joints as well as right SI joint but without reproduction of her typical pain  Strength is 5/5 in bilateral upper and lower extremities  Sensation is intact to light touch throughout bilateral upper and lower extremities except distal to ankles bilaterally    ASSESSMENT AND PLAN:     Encounter Diagnosis:  1.  Left L4/5 lumbar radiculopathy  2.  Lumbar facet mediated pain  3.  Peripheral neuropathic pain       Tracey Mandujano is a 74 year old year old female  who presents to the pain clinic with chronic low back and left radicular nerve pain, s/p L5-S1 left paramedian interlaminar epidural steroid injection on 12/16/2019 with substantial relief of pain.    RECOMMENDATIONS:     1. Medications: She is tolerating the Cymbalta with benefit for her pain.  We recommend continuing this medication as prescribed with refills coming from primary care provider.    2. Procedure: She will call the office if she would like to repeat the injection    3.  Referrals: External physical therapy referral for thoracic myofascial pain in the setting of " deconditioning    Follow up: As needed    Patient seen and examined with Dr. Sen who agrees with the assessment and plan.     ATTENDING ATTESTATION  I saw the patient along with the pain medicine fellow Dr. Bozena Headley. Please see her note above for full details. I have edited her note and agree with it. I was involved in gathering history, physical examination and development of the plan of care.  The patient is satisfied with her pain control and will follow up with me as needed for future interventions. She was provided the contact information of the clinic.    Again, thank you for allowing me to participate in the care of your patient.      Sincerely,    Su Sen MD

## 2020-01-27 NOTE — NURSING NOTE
LPN reviewed AVS with Pt.  Pt verbalized an understanding of information, and was asked to contact clinic with questions.    Follow up recommended by provider: KAREN Rosas LPN

## 2020-01-27 NOTE — PROGRESS NOTES
Pain clinic follow up note    HPI:   Tracey Mandujano is a 74 year old year old female  who presents to the pain clinic with chronic axial low back and left leg pain as well as length dependent peripheral neuropathy, now s/p L5-S1 left paramedian interlaminar epidural steroid injection on 12/16/2019.    Patient Supplied Answers To the [facility] Questionnaire  [facility] -  Patient Entered Questionnaire/Answers 11/3/2019   What number best describes your pain right now:  0 = No pain  to  10 = Worst pain imaginable 7   How would you describe the pain? sharp, numbness, dull, aching, pressure   Which of the following worsen your pain? standing, walking   Which of the following improve or reduce your pain?  lying down   What number best describes your average pain for the past week:  0 = No pain  to  10 = Worst pain imaginable 7   What number best describes your LOWEST pain in past 24 hours:  0 = No pain  to  10 = Worst pain imaginable 3   What number best describes your WORST pain in past 24 hours:  0 = No pain  to  10 = Worst pain imaginable 7   When is your pain worst? Constant   What non-medicine treatments have you already had for your pain? physical therapy, acupuncture, chiropractic care, spine injections (shots)   Have you tried treating your pain with medication?  Yes   Are you currently taking medications for your pain? Yes       Pain today is 2/10    Patient reports compared to the last visit their pain is improved by following percent:    Pain intensity: 45 %  Pain frequency: 50 %  Duration of pain episodes: 45 %  Falling asleep: no change, wasn't problematic prior to the injection  Staying asleep: no change  Ability to work: back is much better, able to play cards with friends now  Ability to exercise: 25 %  Performing chores: 50 %, stamina is not good, she thinks this is due to both deconditioning as well as peripheral neuropathy and associated balance difficulties.    She states that she is now better able to  "complete household chores as well as socialize with friends, however now she feels limited by \"stamina\".  She states she gets axial low back pain that radiates to her thoracic spine as well as left shoulder with activities.  She attributes this to deconditioning as a result of decreased activities due to her pain.  She participated in physical therapy in October and November 2019, however stopped participating due to a feeling that she was not optimizing the therapy due to pain.       ROS:  Review of Systems   Musculoskeletal: Positive for back pain.         Significant Medical History:   Past Medical History:   Diagnosis Date     Hypertension           Past Surgical History:  Past Surgical History:   Procedure Laterality Date     GI SURGERY      lap band     GYN SURGERY      hysterectomy     INJECT EPIDURAL LUMBAR / SACRAL SINGLE Left 12/16/2019    Procedure: Left Lumbar 5-sacral 1 interlaminar epidural steroid injection with fluoroscopy;  Surgeon: Su Sen MD;  [facility]     ORTHOPEDIC SURGERY      right shoulder replacement, left total knee replacement          Family History:  Reviewed and noncontributory    Social History:  Social History     Socioeconomic History     Marital status:      Spouse name: Not on file     Number of children: Not on file     Years of education: Not on file     Highest education level: Not on file   Occupational History     Not on file   Social Needs     Financial resource strain: Not on file     Food insecurity:     Worry: Not on file     Inability: Not on file     Transportation needs:     Medical: Not on file     Non-medical: Not on file   Tobacco Use     Smoking status: Former Smoker     Smokeless tobacco: Never Used   Substance and Sexual Activity     Alcohol use: Not Currently     Drug use: Not on file     Sexual activity: Not on file   Lifestyle     Physical activity:     Days per week: Not on file     Minutes per session: Not on file     Stress: Not on file "   Relationships     Social connections:     Talks on phone: Not on file     Gets together: Not on file     Attends Protestant service: Not on file     Active member of club or organization: Not on file     Attends meetings of clubs or organizations: Not on file     Relationship status: Not on file     Intimate partner violence:     Fear of current or ex partner: Not on file     Emotionally abused: Not on file     Physically abused: Not on file     Forced sexual activity: Not on file   Other Topics Concern     Not on file   Social History Narrative     Not on file     Social History     Social History Narrative     Not on file          Allergies:  Allergies   Allergen Reactions     Hydromorphone Other (See Comments)     Mental change, hallucinations        Current Medications:   Current Outpatient Medications   Medication Sig Dispense Refill     cholecalciferol 1000 units TABS Take 1,000 Units by mouth every morning        citalopram (CELEXA) 20 MG tablet Take 20 mg by mouth daily  3     DULoxetine (CYMBALTA) 30 MG capsule Take 2 capsules (60 mg) by mouth 2 times daily Start 1 cap at night for a week then increase to 2 capsules at night. (Patient taking differently: Take 60 mg by mouth At Bedtime ) 60 capsule 1     folic acid (FOLVITE) 1 MG tablet Take 1 mg by mouth every morning        gabapentin (NEURONTIN) 300 MG capsule TAKE 2 CAPSULES BY MOUTH THREE TIMES DAILY 180 capsule 1     lisinopril (PRINIVIL,ZESTRIL) 5 MG tablet Take 5 mg by mouth every morning        Multiple Vitamins-Minerals (MULTIVITAMIN WOMEN PO) Take 1 capsule by mouth every morning       oxybutynin ER (DITROPAN-XL) 5 MG 24 hr tablet Take 5 mg by mouth every morning        simvastatin (ZOCOR) 20 MG tablet Take 20 mg by mouth At Bedtime        vitamin (B COMPLEX) tablet Take 1 tablet by mouth every morning             Current Pain Medications:    Gabapentin 600 mg 3 times daily  Duloxetine 60 mg daily    Physical Exam:     /84   Pulse 67    "Temp 97.8  F (36.6  C) (Oral)   Resp 19   Ht 1.549 m (5' 1\")   Wt 80.1 kg (176 lb 9.6 oz)   SpO2 99%   BMI 33.37 kg/m      General Appearance: No distress, seated comfortably  Mood: Euthymic  HE ENT: Non constricted pupils  Respiratory: Non labored breathing  CVS: Regular rate and rhythm  GI: Soft, non distended, no TTP  Skin: No rashes over exposed skin  MS: Normal muscle bulk  Neuro: Cranial nerves 2-12 intact  Gait: non antalgic, ambulates with out assistance    Pain specific exam:    Tenderness to palpation over bilateral lumbar paraspinal muscles/facet joints as well as right SI joint but without reproduction of her typical pain  Strength is 5/5 in bilateral upper and lower extremities  Sensation is intact to light touch throughout bilateral upper and lower extremities except distal to ankles bilaterally    ASSESSMENT AND PLAN:     Encounter Diagnosis:  1.  Left L4/5 lumbar radiculopathy  2.  Lumbar facet mediated pain  3.  Peripheral neuropathic pain       Tracey Mandujano is a 74 year old year old female  who presents to the pain clinic with chronic low back and left radicular nerve pain, s/p L5-S1 left paramedian interlaminar epidural steroid injection on 12/16/2019 with substantial relief of pain.    RECOMMENDATIONS:     1. Medications: She is tolerating the Cymbalta with benefit for her pain.  We recommend continuing this medication as prescribed with refills coming from primary care provider.    2. Procedure: She will call the office if she would like to repeat the injection    3.  Referrals: External physical therapy referral for thoracic myofascial pain in the setting of deconditioning    Follow up: As needed    Patient seen and examined with Dr. Sen who agrees with the assessment and plan.     ATTENDING ATTESTATION  I saw the patient along with the pain medicine fellow Dr. Bozena Headley. Please see her note above for full details. I have edited her note and agree with it. I was involved in " gathering history, physical examination and development of the plan of care. The patient is satisfied with her pain control and will follow up with me as needed for future interventions. She was provided the contact information of the clinic.

## 2020-02-20 DIAGNOSIS — M51.16 LUMBAR DISC HERNIATION WITH RADICULOPATHY: ICD-10-CM

## 2020-02-20 NOTE — TELEPHONE ENCOUNTER
[facility] Call Center    Phone Message    May a detailed message be left on voicemail: yes     Reason for Call: Medication Refill Request    Has the patient contacted the pharmacy for the refill? Yes   Name of medication being requested: gabapentin (NEURONTIN) 300 MG capsule  Provider who prescribed the medication: [facility]  Pharmacy: [facility]      Action Taken: Message routed to:  Adult Clinics: Sports Medicine p [id number]    Travel Screening: Not Applicable

## 2020-02-24 RX ORDER — GABAPENTIN 300 MG/1
CAPSULE ORAL
Qty: 180 CAPSULE | Refills: 1 | Status: SHIPPED | OUTPATIENT
Start: 2020-02-24 | End: 2020-05-06

## 2020-03-01 ENCOUNTER — HEALTH MAINTENANCE LETTER (OUTPATIENT)
Age: 75
End: 2020-03-01

## 2020-03-02 ENCOUNTER — TELEPHONE (OUTPATIENT)
Dept: ANESTHESIOLOGY | Facility: CLINIC | Age: 75
End: 2020-03-02

## 2020-03-02 NOTE — TELEPHONE ENCOUNTER
[facility] Call Center    Phone Message    May a detailed message be left on voicemail: yes     Reason for Call: Order(s): Other:   Reason for requested: Physical Therapy  Date needed: 3/2/2020  Provider name: Francy Cheek wanting new orders sent to [facility] in [place]. Phone number is #[phone], no fax given. Please follow up with pt when available. Thank you        Action Taken: Message routed to:  [facility]    Travel Screening: Not Applicable

## 2020-03-02 NOTE — TELEPHONE ENCOUNTER
[facility] Call Center    Phone Message    May a detailed message be left on voicemail: yes     Reason for Call: Medication Refill Request    Has the patient contacted the pharmacy for the refill? Yes   Name of medication being requested: DULoxetine (CYMBALTA) 30 MG capsule  Provider who prescribed the medication: Francy  Pharmacy: [facility] on file [place]  Date medication is needed: 3/2/2020         Action Taken: Message routed to:  [facility] Pain    Travel Screening: Not Applicable

## 2020-03-02 NOTE — TELEPHONE ENCOUNTER
I called and spoke with the pt and informed her that she still has refills available at her [facility].    Pt stated verbal understanding and had no further questions or concerns.    Adela Dyer, CMA

## 2020-03-03 NOTE — TELEPHONE ENCOUNTER
I faxed over the pt's PT order to the [facility] in [place] at  [phone].    Adela Dyer, CMA

## 2020-03-06 ENCOUNTER — TELEPHONE (OUTPATIENT)
Dept: ANESTHESIOLOGY | Facility: CLINIC | Age: 75
End: 2020-03-06

## 2020-05-06 DIAGNOSIS — M51.16 LUMBAR DISC HERNIATION WITH RADICULOPATHY: ICD-10-CM

## 2020-05-06 RX ORDER — GABAPENTIN 300 MG/1
CAPSULE ORAL
Qty: 180 CAPSULE | Refills: 0 | Status: SHIPPED | OUTPATIENT
Start: 2020-05-06 | End: 2020-06-08

## 2020-06-08 DIAGNOSIS — M51.16 LUMBAR DISC HERNIATION WITH RADICULOPATHY: ICD-10-CM

## 2020-06-08 NOTE — TELEPHONE ENCOUNTER
gabapentin (NEURONTIN) 300 MG capsule  180 capsule  0  5/6/2020   No    Sig: TAKE 2 CAPSULES BY MOUTH THREE TIMES DAILY    Sent to pharmacy as: Gabapentin 300 MG Oral Capsule (NEURONTIN)    Class: E-Prescribe    Order: [id number]    E-Prescribing Status: Receipt confirmed by pharmacy (5/6/2020 12:20 PM CDT)

## 2020-06-09 RX ORDER — GABAPENTIN 300 MG/1
CAPSULE ORAL
Qty: 180 CAPSULE | Refills: 0 | Status: SHIPPED | OUTPATIENT
Start: 2020-06-09 | End: 2020-08-17

## 2020-06-19 ENCOUNTER — TELEPHONE (OUTPATIENT)
Dept: ANESTHESIOLOGY | Facility: CLINIC | Age: 75
End: 2020-06-19

## 2020-06-19 NOTE — TELEPHONE ENCOUNTER
Received call from patient requesting to schedule an injection with Dr Sen. Patient can be reached at [phone]

## 2020-06-26 DIAGNOSIS — M79.2 NEUROPATHIC PAIN: Primary | ICD-10-CM

## 2020-06-30 DIAGNOSIS — Z11.59 ENCOUNTER FOR SCREENING FOR OTHER VIRAL DISEASES: Primary | ICD-10-CM

## 2020-06-30 PROBLEM — M79.2 NEUROPATHIC PAIN: Status: ACTIVE | Noted: 2020-06-30

## 2020-07-21 ENCOUNTER — HOSPITAL ENCOUNTER (OUTPATIENT)
Facility: AMBULATORY SURGERY CENTER | Age: 75
End: 2020-07-21
Attending: ANESTHESIOLOGY
Payer: MEDICARE

## 2020-07-21 DIAGNOSIS — M79.2 NEUROPATHIC PAIN: ICD-10-CM

## 2020-08-14 DIAGNOSIS — M51.16 LUMBAR DISC HERNIATION WITH RADICULOPATHY: ICD-10-CM

## 2020-08-17 RX ORDER — GABAPENTIN 300 MG/1
CAPSULE ORAL
Qty: 180 CAPSULE | Refills: 0 | Status: SHIPPED | OUTPATIENT
Start: 2020-08-17

## 2020-08-20 ENCOUNTER — TELEPHONE (OUTPATIENT)
Dept: ANESTHESIOLOGY | Facility: CLINIC | Age: 75
End: 2020-08-20

## 2020-08-20 DIAGNOSIS — M54.16 LUMBAR RADICULOPATHY: ICD-10-CM

## 2020-08-20 RX ORDER — DULOXETIN HYDROCHLORIDE 30 MG/1
60 CAPSULE, DELAYED RELEASE ORAL 2 TIMES DAILY
Qty: 60 CAPSULE | Refills: 1 | Status: CANCELLED | OUTPATIENT
Start: 2020-08-20

## 2020-08-20 NOTE — TELEPHONE ENCOUNTER
"RNCC called back pt's daughter Stephanie. She reports changes in pt's status since she was last seen in clinic. Pt has new dx of parkinsons. She also states that pt recently had shoulder surgery and \"has not been doing well since.\" Stephanie states that pt is experiencing hallucinations, frequently crying, and is confused. RNCC advised Stephanie to call pt's PCP, shoulder surgeon, and neurologist who manages parkinson medications. She verbalized understanding.     RNCC assisted with scheduled video visit 8/25/20 at 1200 with Dr. Sen for medication management. Duloxetine not refilled in light of pt's changes since last visit. Stephanie states pt has been off of duloxetine for approximately 1 week. Dr. Sen updated.     Stephanie states that pt has been staying with her for the past few weeks and video visit will be conducted through Stephanie's smart phone. Her phone number for visit: [phone]    ALL Nuñez, RN    "

## 2020-08-20 NOTE — TELEPHONE ENCOUNTER
[facility] Call Center    Phone Message    May a detailed message be left on voicemail: yes     Reason for Call: Medication Refill Request    Has the patient contacted the pharmacy for the refill? Yes   Name of medication being requested: DULoxetine (CYMBALTA) 30 MG capsule  Provider who prescribed the medication: Su Sen MD   Pharmacy: [facility], [address] [place] [address], [phone]  Date medication is needed: ASAP, patient has been out for a week. Please contact daughter once medication is sent.       Action Taken: Message routed to:  [facility]    Travel Screening: Not Applicable

## 2020-08-21 ENCOUNTER — TELEPHONE (OUTPATIENT)
Dept: ANESTHESIOLOGY | Facility: CLINIC | Age: 75
End: 2020-08-21

## 2020-08-21 DIAGNOSIS — M54.16 LUMBAR RADICULOPATHY: ICD-10-CM

## 2020-08-21 NOTE — TELEPHONE ENCOUNTER
[facility] Call Center    Phone Message    May a detailed message be left on voicemail: yes     Reason for Call: Medication Refill Request    Has the patient contacted the pharmacy for the refill? Yes   Name of medication being requested: DULoxetine (CYMBALTA) 30 MG capsule   Provider who prescribed the medication: Dr Sen  Pharmacy: [facility]   Date medication is needed: ASAP         Action Taken: Message routed to:  [facility] Pain    Travel Screening: Not Applicable

## 2020-08-24 NOTE — TELEPHONE ENCOUNTER
Patient has appointment with Dr. Sen on 8/25/20 and can discuss medication refill with the pt at this time.   Mela Rosas LPN

## 2020-09-04 DIAGNOSIS — Z11.59 ENCOUNTER FOR SCREENING FOR OTHER VIRAL DISEASES: ICD-10-CM

## 2020-09-04 PROCEDURE — U0003 INFECTIOUS AGENT DETECTION BY NUCLEIC ACID (DNA OR RNA); SEVERE ACUTE RESPIRATORY SYNDROME CORONAVIRUS 2 (SARS-COV-2) (CORONAVIRUS DISEASE [COVID-19]), AMPLIFIED PROBE TECHNIQUE, MAKING USE OF HIGH THROUGHPUT TECHNOLOGIES AS DESCRIBED BY CMS-2020-01-R: HCPCS | Performed by: ANESTHESIOLOGY

## 2020-09-05 LAB
SARS-COV-2 RNA SPEC QL NAA+PROBE: NOT DETECTED
SPECIMEN SOURCE: NORMAL

## 2020-09-08 ENCOUNTER — SURGERY (OUTPATIENT)
Age: 75
End: 2020-09-08
Payer: MEDICARE

## 2020-09-08 ENCOUNTER — ANCILLARY PROCEDURE (OUTPATIENT)
Dept: RADIOLOGY | Facility: AMBULATORY SURGERY CENTER | Age: 75
End: 2020-09-08
Attending: ANESTHESIOLOGY
Payer: MEDICARE

## 2020-09-08 VITALS
SYSTOLIC BLOOD PRESSURE: 138 MMHG | HEART RATE: 65 BPM | RESPIRATION RATE: 12 BRPM | OXYGEN SATURATION: 100 % | TEMPERATURE: 98.4 F | DIASTOLIC BLOOD PRESSURE: 76 MMHG

## 2020-09-08 DIAGNOSIS — M79.2 NEUROPATHIC PAIN: ICD-10-CM

## 2020-09-08 RX ORDER — LIDOCAINE HYDROCHLORIDE 10 MG/ML
INJECTION, SOLUTION EPIDURAL; INFILTRATION; INTRACAUDAL; PERINEURAL PRN
Status: DISCONTINUED | OUTPATIENT
Start: 2020-09-08 | End: 2020-09-08 | Stop reason: HOSPADM

## 2020-09-08 RX ORDER — BUPIVACAINE HYDROCHLORIDE 2.5 MG/ML
INJECTION, SOLUTION EPIDURAL; INFILTRATION; INTRACAUDAL PRN
Status: DISCONTINUED | OUTPATIENT
Start: 2020-09-08 | End: 2020-09-08 | Stop reason: HOSPADM

## 2020-09-08 RX ORDER — METHYLPREDNISOLONE ACETATE 40 MG/ML
INJECTION, SUSPENSION INTRA-ARTICULAR; INTRALESIONAL; INTRAMUSCULAR; SOFT TISSUE PRN
Status: DISCONTINUED | OUTPATIENT
Start: 2020-09-08 | End: 2020-09-08 | Stop reason: HOSPADM

## 2020-09-08 RX ORDER — CARBIDOPA/LEVODOPA 10MG-100MG
1 TABLET ORAL 3 TIMES DAILY
COMMUNITY
End: 2022-03-24

## 2020-09-08 RX ORDER — IOPAMIDOL 408 MG/ML
INJECTION, SOLUTION INTRATHECAL PRN
Status: DISCONTINUED | OUTPATIENT
Start: 2020-09-08 | End: 2020-09-08 | Stop reason: HOSPADM

## 2020-09-08 RX ADMIN — METHYLPREDNISOLONE ACETATE 40 MG: 40 INJECTION, SUSPENSION INTRA-ARTICULAR; INTRALESIONAL; INTRAMUSCULAR; SOFT TISSUE at 14:08

## 2020-09-08 RX ADMIN — IOPAMIDOL 2 ML: 408 INJECTION, SOLUTION INTRATHECAL at 14:15

## 2020-09-08 RX ADMIN — LIDOCAINE HYDROCHLORIDE 3 ML: 10 INJECTION, SOLUTION EPIDURAL; INFILTRATION; INTRACAUDAL; PERINEURAL at 14:15

## 2020-09-08 RX ADMIN — BUPIVACAINE HYDROCHLORIDE 3 ML: 2.5 INJECTION, SOLUTION EPIDURAL; INFILTRATION; INTRACAUDAL at 14:15

## 2020-09-08 NOTE — H&P
Tracey MEDRANO Nazia  [id number]  female  75 year old      Reason for procedure/surgery: low back pain    Patient Active Problem List   Diagnosis     Lumbar radiculopathy     Neuropathic pain       Past Surgical History:    Past Surgical History:   Procedure Laterality Date     ABDOMEN SURGERY       BIOPSY       COLONOSCOPY       GI SURGERY      lap band     GYN SURGERY      hysterectomy     INJECT EPIDURAL LUMBAR / SACRAL SINGLE Left 2019    Procedure: Left Lumbar 5-sacral 1 interlaminar epidural steroid injection with fluoroscopy;  Surgeon: Su Sen MD;  Location: [facility] OR     ORTHOPEDIC SURGERY      right shoulder replacement, left total knee replacement       Past Medical History:   Past Medical History:   Diagnosis Date     Arthritis      Cancer (H)      Diabetes (H)      Hypertension        Social History:   Social History     Tobacco Use     Smoking status: Former Smoker     Packs/day: 0.00     Years: 0.00     Pack years: 0.00     Types: Cigarettes     Start date: 1965     Last attempt to quit: 1980     Years since quittin.7     Smokeless tobacco: Never Used   Substance Use Topics     Alcohol use: Never       Family History: History reviewed. No pertinent family history.    Allergies:   Allergies   Allergen Reactions     Hydromorphone Other (See Comments)     Mental change, hallucinations        Active Medications:   Current Outpatient Medications   Medication Sig Dispense Refill     carbidopa-levodopa (SINEMET)  MG tablet Take 1 tablet by mouth 3 times daily       cholecalciferol 1000 units TABS Take 1,000 Units by mouth every morning        citalopram (CELEXA) 20 MG tablet Take 20 mg by mouth daily  3     folic acid (FOLVITE) 1 MG tablet Take 1 mg by mouth every morning        lisinopril (PRINIVIL,ZESTRIL) 5 MG tablet Take 5 mg by mouth every morning        Multiple Vitamins-Minerals (MULTIVITAMIN WOMEN PO) Take 1 capsule by mouth every morning       oxybutynin ER (DITROPAN-XL) 5  MG 24 hr tablet Take 5 mg by mouth every morning        simvastatin (ZOCOR) 20 MG tablet Take 20 mg by mouth At Bedtime        vitamin (B COMPLEX) tablet Take 1 tablet by mouth every morning        DULoxetine (CYMBALTA) 30 MG capsule Take 2 capsules (60 mg) by mouth 2 times daily Start 1 cap at night for a week then increase to 2 capsules at night. (Patient taking differently: Take 60 mg by mouth At Bedtime ) 60 capsule 1     gabapentin (NEURONTIN) 300 MG capsule TAKE 2 CAPSULES BY MOUTH THREE TIMES DAILY 180 capsule 0       Systemic Review:   CONSTITUTIONAL: NEGATIVE for fever, chills, change in weight  ENT/MOUTH: NEGATIVE for ear, mouth and throat problems  RESP: NEGATIVE for significant cough or SOB  CV: NEGATIVE for chest pain, palpitations or peripheral edema    Physical Examination:   Vital Signs: /76   Pulse 65   Temp 98.4  F (36.9  C) (Temporal)   Resp 12   SpO2 100%   GENERAL: healthy, alert and no distress  NECK: no adenopathy, no asymmetry, masses, or scars  RESP: lungs clear to auscultation - no rales, rhonchi or wheezes  CV: regular rate and rhythm, normal S1 S2, no S3 or S4, no murmur, click or rub, no peripheral edema and peripheral pulses strong  ABDOMEN: soft, nontender, no hepatosplenomegaly, no masses and bowel sounds normal  MS: no gross musculoskeletal defects noted, no edema    Plan: Appropriate to proceed as scheduled.      Su Sen MD  9/8/2020

## 2020-09-08 NOTE — DISCHARGE INSTRUCTIONS
PAIN INJECTION HOME CARE INSTRUCTIONS  Activity  -You may resume most normal activity levels with the exception of strenuous activity. It may help to move in ways that hurt before the injection, to see if the pain is still there, but do not overdo it. Take it easy for the rest of the day.    -DO NOT remove bandaid for 24 hours  -DO NOT shower for 24 hours      Pain  -You may feel immediate pain relief and numbness for a period of time after the injection. This may indicate the medication has reached the right spot.  -Your pain may return after this short pain-free period, or may even be a little worse for a day or two. It may be caused by needle irritation or by the medication itself. The medications usually take two or three days to start working, but can take as long as a week.    -You may use an ice pack for 20 minutes every 2 hours for the first 24 hours  -You may use a heating pad after the first 24 hours  -You may use Tylenol (acetaminophen) every 4 hours or other pain medicines as directed by your physician      DID YOU RECEIVE SEDATION TODAY?  No    DID YOU RECEIVE STEROIDS TODAY?  Yes    Common side effects of steroids:  Not everyone will experience corticosteroid side effects. If side effects are experienced, they will gradually subside in the 7-10 day period following an injection. Most common side effects include:  -Flushed face and/or chest  -Feeling of warmth, particularly in the face but could be an overall feeling of warmth  -Increased blood sugar in diabetic patients  -Menstrual irregularities my occur. If taking hormone-based birth control an alternate method of birth control is recommended  -Sleep disturbances and/or mood swings are possible  -Leg cramps    PLEASE KEEP TRACK OF YOUR SYMPTOMS AND NOTE ANY CHANGES FOR YOUR DOCTOR.       Please contact us if you have:  -Severe pain  -Fever more than 101.5 degrees Fahrenheit  -Signs of infection at the injection site (redness, swelling, or  drainage)    If you have questions, please contact the Pain Clinic at [phone] between the hours of 7:00 am and 3:00 pm Monday through Friday. After office hours you can contact the on call provider by dialing [phone]. If you need immediate attention, we recommend that you go to a hospital emergency room or dial [phone].    If you have scheduling questions please call [phone].

## 2020-09-08 NOTE — PROCEDURES
Lumbar Epidural Steroid Injection    Procedure:  1. Lumbar epidural steroid injection at left L5-S1  2. Fluoroscopy for needle guidance    Pre-operative Diagnosis:  1. Intervertebral disc disorders w radiculopathy, lumbosacral region  2. Other intervertebral disc degeneration, lumbosacral region    Post-Operative Diagnosis:  1. Intervertebral disc disorders w radiculopathy, lumbosacral region  2. Other intervertebral disc degeneration, lumbosacral region    Anesthesia:  Local anesthesia    Staff: JACKIE Chester  Resident: Eloise Arboleda MD    Medical Indications:  The patient presents to the ambulatory surgery center today for the treatment of persistent pain. We believe that the pain is spinally mediated. The patient has been exhibiting symptoms consistent with lumbar intraspinal inflammation and radiculopathy. Symptoms have been persistent, disabling and intermittently severe. The patient has been evaluated in the pain clinic and scheduled today for a spinal injection to aid in the diagnosis and treatment of presumed spinal pain. The risks, benefits, potential complications, and alternatives were discussed with the patient as per the signed consent form, and informed consent was obtained. The patient has received information about the procedure and its risks. The physician personally confirmed the procedure, side, and site to be injected with the patient and marked the site in the pre-procedure area.    Description of Procedure:  An additional intraoperative timeout, specifically to confirm accurate localization, was conducted by the attending physician. The patient remained awake and alert throughout the procedure. The patient was placed in the prone position. The skin was prepped with chlorhexidine and sterile drapes were applied. The skin and soft tissues were anesthetized with lidocaine 1%. A 22 gauge 3.5 inch touhy needle epidural needle was inserted percutaneously and advanced under fluoroscopic guidance  using AP, and lateral projections. Using loss of resistance to air and a left sided interlaminar approach, the L5/S1 posterior epidural space was entered after the lamina was contacted with the epidural needle. No paraesthesias occurred and aspiration was negative. Epidurography and radiographic interpretation: Epidurography was performed by injection of Isovue 200 under live fluoroscopy. Multiple Xray images were obtained. Radiologic examination confirmed proper spread of the contrast medium within the epidural space. There was no evidence of intrathecal or intravascular runoff. The needle was injected with 40mg methylprednisolone mixed with 3 ml of 0.25% bupivacaine. The needle was removed after flushing with 1% lidocaine. A sterile bandage was applied. The patient did not experience any hemodynamic or neurologic sequelae. The patient was transferred to the recovery area. Post operative instructions were explained and given to the patient.    I helped the resident at all critical times and was present throughout the procedure.     Complications:  No procedural or immediate post-procedural complications occurred and the patient was transferred to PACU in stable condition.      Post-Operative Plan:  The patient will monitor pain relief from today's procedure. They will call the clinic for any problems related to today's procedure. A copy of our written post-procedure instructions was provided. They will return to clinic as scheduled.

## 2020-10-16 ENCOUNTER — DOCUMENTATION ONLY (OUTPATIENT)
Dept: ANESTHESIOLOGY | Facility: CLINIC | Age: 75
End: 2020-10-16

## 2020-10-16 NOTE — PROGRESS NOTES
The procedure was completed 9/8/2020. The note was pasted here again because the original note was incorrectly linked to a different encounter.     Lumbar Epidural Steroid Injection     Procedure:  1. Lumbar epidural steroid injection at left L5-S1  2. Fluoroscopy for needle guidance     Pre-operative Diagnosis:  1. Intervertebral disc disorders w radiculopathy, lumbosacral region  2. Other intervertebral disc degeneration, lumbosacral region     Post-Operative Diagnosis:  1. Intervertebral disc disorders w radiculopathy, lumbosacral region  2. Other intervertebral disc degeneration, lumbosacral region     Anesthesia:  Local anesthesia     Staff: JACKIE Chester  Resident: Eloise Arboleda MD     Medical Indications:  The patient presents to the ambulatory surgery center today for the treatment of persistent pain. We believe that the pain is spinally mediated. The patient has been exhibiting symptoms consistent with lumbar intraspinal inflammation and radiculopathy. Symptoms have been persistent, disabling and intermittently severe. The patient has been evaluated in the pain clinic and scheduled today for a spinal injection to aid in the diagnosis and treatment of presumed spinal pain. The risks, benefits, potential complications, and alternatives were discussed with the patient as per the signed consent form, and informed consent was obtained. The patient has received information about the procedure and its risks. The physician personally confirmed the procedure, side, and site to be injected with the patient and marked the site in the pre-procedure area.     Description of Procedure:  An additional intraoperative timeout, specifically to confirm accurate localization, was conducted by the attending physician. The patient remained awake and alert throughout the procedure. The patient was placed in the prone position. The skin was prepped with chlorhexidine and sterile drapes were applied. The skin and soft tissues  were anesthetized with lidocaine 1%. A 22 gauge 3.5 inch touhy needle epidural needle was inserted percutaneously and advanced under fluoroscopic guidance using AP, and lateral projections. Using loss of resistance to air and a left sided interlaminar approach, the L5/S1 posterior epidural space was entered after the lamina was contacted with the epidural needle. No paraesthesias occurred and aspiration was negative. Epidurography and radiographic interpretation: Epidurography was performed by injection of Isovue 200 under live fluoroscopy. Multiple Xray images were obtained. Radiologic examination confirmed proper spread of the contrast medium within the epidural space. There was no evidence of intrathecal or intravascular runoff. The needle was injected with 40mg methylprednisolone mixed with 3 ml of 0.25% bupivacaine. The needle was removed after flushing with 1% lidocaine. A sterile bandage was applied. The patient did not experience any hemodynamic or neurologic sequelae. The patient was transferred to the recovery area. Post operative instructions were explained and given to the patient.     I helped the resident at all critical times and was present throughout the procedure.      Complications:  No procedural or immediate post-procedural complications occurred and the patient was transferred to PACU in stable condition.        Post-Operative Plan:  The patient will monitor pain relief from today's procedure. They will call the clinic for any problems related to today's procedure. A copy of our written post-procedure instructions was provided. They will return to clinic as scheduled.

## 2020-12-13 ENCOUNTER — HEALTH MAINTENANCE LETTER (OUTPATIENT)
Age: 75
End: 2020-12-13

## 2021-01-11 ENCOUNTER — TELEPHONE (OUTPATIENT)
Dept: ANESTHESIOLOGY | Facility: CLINIC | Age: 76
End: 2021-01-11

## 2021-01-11 DIAGNOSIS — M54.16 LUMBAR RADICULOPATHY: Primary | ICD-10-CM

## 2021-01-11 NOTE — TELEPHONE ENCOUNTER
Attempt # 1    I called [facility] to schedule a procedure with Dr. Sen. Left a voicemail with my call back number and request that they leave a good call back time if they get my voicemail.    Brenda GILLIS  [facility]-Op Coordinator

## 2021-01-12 DIAGNOSIS — Z11.59 ENCOUNTER FOR SCREENING FOR OTHER VIRAL DISEASES: Primary | ICD-10-CM

## 2021-01-12 PROBLEM — M54.16 LUMBAR RADICULOPATHY: Status: ACTIVE | Noted: 2019-11-08

## 2021-01-22 ENCOUNTER — OFFICE VISIT (OUTPATIENT)
Dept: LAB | Facility: CLINIC | Age: 76
End: 2021-01-22
Payer: MEDICARE

## 2021-01-22 DIAGNOSIS — Z11.59 ENCOUNTER FOR SCREENING FOR OTHER VIRAL DISEASES: ICD-10-CM

## 2021-01-22 PROCEDURE — U0005 INFEC AGEN DETEC AMPLI PROBE: HCPCS | Performed by: ANESTHESIOLOGY

## 2021-01-22 PROCEDURE — U0003 INFECTIOUS AGENT DETECTION BY NUCLEIC ACID (DNA OR RNA); SEVERE ACUTE RESPIRATORY SYNDROME CORONAVIRUS 2 (SARS-COV-2) (CORONAVIRUS DISEASE [COVID-19]), AMPLIFIED PROBE TECHNIQUE, MAKING USE OF HIGH THROUGHPUT TECHNOLOGIES AS DESCRIBED BY CMS-2020-01-R: HCPCS | Performed by: ANESTHESIOLOGY

## 2021-01-23 LAB
SARS-COV-2 RNA RESP QL NAA+PROBE: NOT DETECTED
SPECIMEN SOURCE: NORMAL

## 2021-01-26 ENCOUNTER — HOSPITAL ENCOUNTER (OUTPATIENT)
Facility: AMBULATORY SURGERY CENTER | Age: 76
Discharge: HOME | End: 2021-01-26
Attending: ANESTHESIOLOGY | Admitting: ANESTHESIOLOGY
Payer: MEDICARE

## 2021-01-26 ENCOUNTER — ANCILLARY PROCEDURE (OUTPATIENT)
Dept: RADIOLOGY | Facility: AMBULATORY SURGERY CENTER | Age: 76
End: 2021-01-26
Attending: ANESTHESIOLOGY
Payer: MEDICARE

## 2021-01-26 VITALS
HEART RATE: 56 BPM | TEMPERATURE: 97.7 F | DIASTOLIC BLOOD PRESSURE: 61 MMHG | OXYGEN SATURATION: 100 % | HEIGHT: 61 IN | RESPIRATION RATE: 12 BRPM | BODY MASS INDEX: 33.61 KG/M2 | WEIGHT: 178 LBS | SYSTOLIC BLOOD PRESSURE: 119 MMHG

## 2021-01-26 DIAGNOSIS — M54.16 LUMBAR RADICULOPATHY: ICD-10-CM

## 2021-01-26 DIAGNOSIS — R52 PAIN: ICD-10-CM

## 2021-01-26 PROCEDURE — 62323 NJX INTERLAMINAR LMBR/SAC: CPT

## 2021-01-26 RX ORDER — METHYLPREDNISOLONE ACETATE 40 MG/ML
INJECTION, SUSPENSION INTRA-ARTICULAR; INTRALESIONAL; INTRAMUSCULAR; SOFT TISSUE PRN
Status: DISCONTINUED | OUTPATIENT
Start: 2021-01-26 | End: 2021-01-26 | Stop reason: HOSPADM

## 2021-01-26 RX ORDER — LIDOCAINE HYDROCHLORIDE 10 MG/ML
INJECTION, SOLUTION EPIDURAL; INFILTRATION; INTRACAUDAL; PERINEURAL PRN
Status: DISCONTINUED | OUTPATIENT
Start: 2021-01-26 | End: 2021-01-26 | Stop reason: HOSPADM

## 2021-01-26 RX ORDER — BUPIVACAINE HYDROCHLORIDE 2.5 MG/ML
INJECTION, SOLUTION EPIDURAL; INFILTRATION; INTRACAUDAL PRN
Status: DISCONTINUED | OUTPATIENT
Start: 2021-01-26 | End: 2021-01-26 | Stop reason: HOSPADM

## 2021-01-26 RX ORDER — IOPAMIDOL 408 MG/ML
INJECTION, SOLUTION INTRATHECAL PRN
Status: DISCONTINUED | OUTPATIENT
Start: 2021-01-26 | End: 2021-01-26 | Stop reason: HOSPADM

## 2021-01-26 ASSESSMENT — MIFFLIN-ST. JEOR: SCORE: 1239.78

## 2021-01-26 NOTE — H&P
Tracey MEDRANO Nazia  [id number]  female  75 year old      Reason for procedure/surgery: low back pain    Patient Active Problem List   Diagnosis     Lumbar radiculopathy     Neuropathic pain       Past Surgical History:    Past Surgical History:   Procedure Laterality Date     ABDOMEN SURGERY       BIOPSY       COLONOSCOPY       GI SURGERY      lap band     GYN SURGERY      hysterectomy     INJECT EPIDURAL LUMBAR N/A 2020    Procedure: Left Lumbar 5- Sacral 1 epidural steroid injection with fluoroscopy;  Surgeon: Su Sen MD;  Location: [facility]     INJECT EPIDURAL LUMBAR / SACRAL SINGLE Left 2019    Procedure: Left Lumbar 5-sacral 1 interlaminar epidural steroid injection with fluoroscopy;  Surgeon: Su Sen MD;  Location: [facility]     ORTHOPEDIC SURGERY      right shoulder replacement, left total knee replacement       Past Medical History:   Past Medical History:   Diagnosis Date     Arthritis      Cancer (H)      Diabetes (H)      Hypertension      Parkinsons disease (H)      Sepsis (H)        Social History:   Social History     Tobacco Use     Smoking status: Former Smoker     Packs/day: 0.00     Years: 0.00     Pack years: 0.00     Types: Cigarettes     Start date: 1965     Quit date: 1980     Years since quittin.1     Smokeless tobacco: Never Used   Substance Use Topics     Alcohol use: Never       Family History: History reviewed. No pertinent family history.    Allergies:   Allergies   Allergen Reactions     Hydromorphone Other (See Comments)     Mental change, hallucinations        Active Medications:   Current Outpatient Medications   Medication Sig Dispense Refill     carbidopa-levodopa (SINEMET)  MG tablet Take 1 tablet by mouth 3 times daily       cholecalciferol 1000 units TABS Take 1,000 Units by mouth every morning        citalopram (CELEXA) 20 MG tablet Take 20 mg by mouth daily  3     folic acid (FOLVITE) 1 MG tablet Take 1 mg by mouth every morning         "gabapentin (NEURONTIN) 300 MG capsule TAKE 2 CAPSULES BY MOUTH THREE TIMES DAILY 180 capsule 0     lisinopril (PRINIVIL,ZESTRIL) 5 MG tablet Take 5 mg by mouth every morning        Multiple Vitamins-Minerals (MULTIVITAMIN WOMEN PO) Take 1 capsule by mouth every morning       oxybutynin ER (DITROPAN-XL) 5 MG 24 hr tablet Take 5 mg by mouth every morning        simvastatin (ZOCOR) 20 MG tablet Take 20 mg by mouth At Bedtime        vitamin (B COMPLEX) tablet Take 1 tablet by mouth every morning          Systemic Review:   CONSTITUTIONAL: NEGATIVE for fever, chills, change in weight  ENT/MOUTH: NEGATIVE for ear, mouth and throat problems  RESP: NEGATIVE for significant cough or SOB  CV: NEGATIVE for chest pain, palpitations or peripheral edema    Physical Examination:   Vital Signs: /62   Pulse 58   Temp 96.9  F (36.1  C) (Temporal)   Ht 1.549 m (5' 1\")   Wt 80.7 kg (178 lb)   SpO2 99%   BMI 33.63 kg/m    GENERAL: healthy, alert and no distress  NECK: no adenopathy, no asymmetry, masses, or scars  RESP: lungs clear to auscultation - no rales, rhonchi or wheezes  CV: regular rate and rhythm, normal S1 S2, no S3 or S4, no murmur, click or rub, no peripheral edema and peripheral pulses strong  ABDOMEN: soft, nontender, no hepatosplenomegaly, no masses and bowel sounds normal  MS: no gross musculoskeletal defects noted, no edema    Plan: Appropriate to proceed as scheduled.      Su Sen MD  1/26/2021          "

## 2021-01-26 NOTE — DISCHARGE INSTRUCTIONS
Home Care Instructions after an Epidural Steroid Pain Injection    A lumbar epidural steroid injection delivers steroid medication directly into the area that may be causing your lower back pain and/or leg pain. A cervical or thoracic epidural steroid injection delivers steroids into the epidural space surrounding spinal nerve roots to help relieve pain in the upper spine/neck.    Activity  -Rest today  -Do not work today  -Resume normal activity tomorrow  -DO NOT shower for 24 hours  -DO NOT remove bandaid for 24 hours    Pain  -You may experience soreness at the injection site for one or two days  -You may use an ice pack for 20 minutes every 2 hours for the first 24 hours  -You may use a heating pad after the first 24 hours  -You may use Tylenol (acetaminophen) every 4 hours or other pain medicines as     directed by your physician    You may experience numbness radiating into your legs or arms (depending on the procedure location). This numbness may last several hours. Until sensation returns to normal; please use caution in walking, climbing stairs, and stepping out of your vehicle, etc.    DID YOU RECEIVE SEDATION TODAY?  No    Safety  Sedation medicine, if given, may remain active for many hours. It is important for the next 24 hours that you do not:  -Drive a car  -Operate machines or power tools  -Consume alcohol, including beer  -Sign any important papers or legal documents      Common side effects of steroids:  Not everyone will experience corticosteroid side effects. If side effects are experienced, they will gradually subside in the 7-10 day period following an injection. Most common side effects include:  -Flushed face and/or chest  -Feeling of warmth, particularly in the face but could be an overall feeling of warmth  -Increased blood sugar in diabetic patients  -Menstrual irregularities my occur. If taking hormone-based birth control an alternate method of birth control is recommended  -Sleep  disturbances and/or mood swings are possible  -Leg cramps    Please contact us if you have:  -Severe pain  -Fever more than 101.5 degrees Fahrenheit  -Signs of infection at the injection site (redness, swelling, or drainage)    If you have questions, please contact our office at [phone] between the hours of 7:00 am and 3:00 pm Monday through Friday. After office hours you can contact the on call provider by dialing [phone]. If you need immediate attention, we recommend that you go to a hospital emergency room or dial [phone].

## 2021-02-25 DIAGNOSIS — Z11.59 ENCOUNTER FOR SCREENING FOR OTHER VIRAL DISEASES: ICD-10-CM

## 2021-02-25 DIAGNOSIS — M54.16 LUMBAR RADICULOPATHY: Primary | ICD-10-CM

## 2021-02-26 DIAGNOSIS — Z11.59 ENCOUNTER FOR SCREENING FOR OTHER VIRAL DISEASES: ICD-10-CM

## 2021-02-26 LAB
SARS-COV-2 RNA RESP QL NAA+PROBE: NORMAL
SPECIMEN SOURCE: NORMAL

## 2021-02-26 PROCEDURE — U0005 INFEC AGEN DETEC AMPLI PROBE: HCPCS | Performed by: ANESTHESIOLOGY

## 2021-02-26 PROCEDURE — U0003 INFECTIOUS AGENT DETECTION BY NUCLEIC ACID (DNA OR RNA); SEVERE ACUTE RESPIRATORY SYNDROME CORONAVIRUS 2 (SARS-COV-2) (CORONAVIRUS DISEASE [COVID-19]), AMPLIFIED PROBE TECHNIQUE, MAKING USE OF HIGH THROUGHPUT TECHNOLOGIES AS DESCRIBED BY CMS-2020-01-R: HCPCS | Performed by: ANESTHESIOLOGY

## 2021-02-27 LAB
LABORATORY COMMENT REPORT: NORMAL
SARS-COV-2 RNA RESP QL NAA+PROBE: NEGATIVE
SPECIMEN SOURCE: NORMAL

## 2021-03-06 DIAGNOSIS — Z11.59 ENCOUNTER FOR SCREENING FOR OTHER VIRAL DISEASES: ICD-10-CM

## 2021-03-19 DIAGNOSIS — Z11.59 ENCOUNTER FOR SCREENING FOR OTHER VIRAL DISEASES: ICD-10-CM

## 2021-03-19 LAB
SARS-COV-2 RNA RESP QL NAA+PROBE: NORMAL
SPECIMEN SOURCE: NORMAL

## 2021-03-19 PROCEDURE — U0005 INFEC AGEN DETEC AMPLI PROBE: HCPCS | Performed by: ANESTHESIOLOGY

## 2021-03-19 PROCEDURE — U0003 INFECTIOUS AGENT DETECTION BY NUCLEIC ACID (DNA OR RNA); SEVERE ACUTE RESPIRATORY SYNDROME CORONAVIRUS 2 (SARS-COV-2) (CORONAVIRUS DISEASE [COVID-19]), AMPLIFIED PROBE TECHNIQUE, MAKING USE OF HIGH THROUGHPUT TECHNOLOGIES AS DESCRIBED BY CMS-2020-01-R: HCPCS | Performed by: ANESTHESIOLOGY

## 2021-03-23 ENCOUNTER — ANCILLARY PROCEDURE (OUTPATIENT)
Dept: RADIOLOGY | Facility: AMBULATORY SURGERY CENTER | Age: 76
End: 2021-03-23
Attending: ANESTHESIOLOGY
Payer: MEDICARE

## 2021-03-23 ENCOUNTER — HOSPITAL ENCOUNTER (OUTPATIENT)
Facility: AMBULATORY SURGERY CENTER | Age: 76
Discharge: HOME | End: 2021-03-23
Attending: ANESTHESIOLOGY | Admitting: ANESTHESIOLOGY
Payer: MEDICARE

## 2021-03-23 VITALS
RESPIRATION RATE: 16 BRPM | HEART RATE: 58 BPM | BODY MASS INDEX: 33.61 KG/M2 | HEIGHT: 61 IN | WEIGHT: 178 LBS | SYSTOLIC BLOOD PRESSURE: 139 MMHG | DIASTOLIC BLOOD PRESSURE: 77 MMHG | OXYGEN SATURATION: 98 % | TEMPERATURE: 97.1 F

## 2021-03-23 DIAGNOSIS — R52 PAIN: ICD-10-CM

## 2021-03-23 DIAGNOSIS — M54.16 LUMBAR RADICULOPATHY: ICD-10-CM

## 2021-03-23 PROCEDURE — 62323 NJX INTERLAMINAR LMBR/SAC: CPT

## 2021-03-23 RX ORDER — BUPIVACAINE HYDROCHLORIDE 2.5 MG/ML
INJECTION, SOLUTION EPIDURAL; INFILTRATION; INTRACAUDAL PRN
Status: DISCONTINUED | OUTPATIENT
Start: 2021-03-23 | End: 2021-03-23 | Stop reason: HOSPADM

## 2021-03-23 RX ORDER — LIDOCAINE HYDROCHLORIDE 10 MG/ML
INJECTION, SOLUTION EPIDURAL; INFILTRATION; INTRACAUDAL; PERINEURAL PRN
Status: DISCONTINUED | OUTPATIENT
Start: 2021-03-23 | End: 2021-03-23 | Stop reason: HOSPADM

## 2021-03-23 RX ORDER — METHYLPREDNISOLONE ACETATE 40 MG/ML
INJECTION, SUSPENSION INTRA-ARTICULAR; INTRALESIONAL; INTRAMUSCULAR; SOFT TISSUE PRN
Status: DISCONTINUED | OUTPATIENT
Start: 2021-03-23 | End: 2021-03-23 | Stop reason: HOSPADM

## 2021-03-23 RX ORDER — IOPAMIDOL 408 MG/ML
INJECTION, SOLUTION INTRATHECAL PRN
Status: DISCONTINUED | OUTPATIENT
Start: 2021-03-23 | End: 2021-03-23 | Stop reason: HOSPADM

## 2021-03-23 ASSESSMENT — MIFFLIN-ST. JEOR: SCORE: 1239.78

## 2021-03-23 NOTE — DISCHARGE INSTRUCTIONS
PAIN INJECTION HOME CARE INSTRUCTIONS  Activity  -You may resume most normal activity levels with the exception of strenuous activity. It may help to move in ways that hurt before the injection, to see if the pain is still there, but do not overdo it. Take it easy for the rest of the day.    -DO NOT remove bandaid for 24 hours  -DO NOT shower for 24 hours      Pain  -You may feel immediate pain relief and numbness for a period of time after the injection. This may indicate the medication has reached the right spot.  -Your pain may return after this short pain-free period, or may even be a little worse for a day or two. It may be caused by needle irritation or by the medication itself. The medications usually take two or three days to start working, but can take as long as a week.    -You may use an ice pack for 20 minutes every 2 hours for the first 24 hours  -You may use a heating pad after the first 24 hours  -You may use Tylenol (acetaminophen) every 4 hours or other pain medicines as directed by your physician          DID YOU RECEIVE STEROIDS TODAY?  Yes    Common side effects of steroids:  Not everyone will experience corticosteroid side effects. If side effects are experienced, they will gradually subside in the 7-10 day period following an injection. Most common side effects include:  -Flushed face and/or chest  -Feeling of warmth, particularly in the face but could be an overall feeling of warmth  -Increased blood sugar in diabetic patients  -Menstrual irregularities my occur. If taking hormone-based birth control an alternate method of birth control is recommended  -Sleep disturbances and/or mood swings are possible  -Leg cramps    PLEASE KEEP TRACK OF YOUR SYMPTOMS AND NOTE ANY CHANGES FOR YOUR DOCTOR.       Please contact us if you have:  -Severe pain  -Fever more than 101.5 degrees Fahrenheit  -Signs of infection at the injection site (redness, swelling, or drainage)    If you have questions, please  contact the Pain Clinic at [phone] Option #1 between the hours of 7:00 am and 3:00 pm Monday through Friday. After office hours you can contact the on call provider by dialing [phone]. If you need immediate attention, we recommend that you go to a hospital emergency room or dial [phone].    For patients seen by the PM and R service, please call [phone].    If you have procedure scheduling questions please call [phone] Option #2

## 2021-03-23 NOTE — PROCEDURES
Lumbar Epidural Steroid Injection    Procedure:  1. Lumbar epidural steroid injection at left L5-S1  2. Fluoroscopy for needle guidance    Pre-operative Diagnosis:  1. Intervertebral disc disorders w radiculopathy, lumbosacral region  2. Other intervertebral disc degeneration, lumbosacral region    Post-Operative Diagnosis:  1. Intervertebral disc disorders w radiculopathy, lumbosacral region  2. Other intervertebral disc degeneration, lumbosacral region    Anesthesia:  Local anesthesia    Staff: JACKIE Chester  Fellow: Court Inman MD    Medical Indications:  The patient presents to the ambulatory surgery center today for the treatment of persistent pain. We believe that the pain is spinally mediated. The patient has been exhibiting symptoms consistent with lumbar intraspinal inflammation and radiculopathy. Symptoms have been persistent, disabling and intermittently severe. The patient has been evaluated in the pain clinic and scheduled today for a spinal injection to aid in the diagnosis and treatment of presumed spinal pain. The risks, benefits, potential complications, and alternatives were discussed with the patient as per the signed consent form, and informed consent was obtained. The patient has received information about the procedure and its risks. The physician personally confirmed the procedure, side, and site to be injected with the patient and marked the site in the pre-procedure area.    Description of Procedure:  An additional intraoperative timeout, specifically to confirm accurate localization, was conducted by the attending physician. The patient remained awake and alert throughout the procedure. The patient was placed in the prone position. The skin was prepped with chlorhexidine and sterile drapes were applied. The skin and soft tissues were anesthetized with lidocaine 1%. A 22 gauge 3.5 inch touhy needle epidural needle was inserted percutaneously and advanced under fluoroscopic guidance  using AP, and lateral projections. Using loss of resistance to air and a left sided interlaminar approach, the L5/S1 posterior epidural space was entered after the lamina was contacted with the epidural needle. No paraesthesias occurred and aspiration was negative. Epidurography and radiographic interpretation: Epidurography was performed by injection of Isovue 200 under live fluoroscopy. Multiple Xray images were obtained. Radiologic examination confirmed proper spread of the contrast medium within the epidural space. There was no evidence of intrathecal or intravascular runoff. The needle was injected with 40mg methylprednisolone mixed with 3 ml of 0.25% bupivacaine. The needle was removed after flushing with 1% lidocaine. A sterile bandage was applied. The patient did not experience any hemodynamic or neurologic sequelae. The patient was transferred to the recovery area. Post operative instructions were explained and given to the patient.  I was present for the entire case and helped the fellow at all critical times.     Complications:  No procedural or immediate post-procedural complications occurred and the patient was transferred to PACU in stable condition.  Procedure Images:    Post-Operative Plan:  The patient will monitor pain relief from today's procedure. They will call the clinic for any problems related to today's procedure. A copy of our written post-procedure instructions was provided. They will return to clinic as scheduled.

## 2021-03-23 NOTE — H&P
Tracey MEDRANO Nazia  [id number]  female  75 year old      Reason for procedure/surgery: back pain    Patient Active Problem List   Diagnosis     Lumbar radiculopathy     Neuropathic pain       Past Surgical History:    Past Surgical History:   Procedure Laterality Date     ABDOMEN SURGERY       BIOPSY       COLONOSCOPY       GI SURGERY      lap band     GYN SURGERY      hysterectomy     INJECT EPIDURAL LUMBAR N/A 2020    Procedure: Left Lumbar 5- Sacral 1 epidural steroid injection with fluoroscopy;  Surgeon: Su Sen MD;  [facility]     INJECT EPIDURAL LUMBAR Left 2021    Procedure: Left Lumbar 5- Sacral 1 epidural steroid injection with fluoroscopy;  Surgeon: Su Sen MD;  Location: [facility]     INJECT EPIDURAL LUMBAR / SACRAL SINGLE Left 2019    Procedure: Left Lumbar 5-sacral 1 interlaminar epidural steroid injection with fluoroscopy;  Surgeon: Su Sen MD;  [facility]     ORTHOPEDIC SURGERY      right shoulder replacement, left total knee replacement       Past Medical History:   Past Medical History:   Diagnosis Date     Arthritis      Cancer (H)      Diabetes (H)      Hypertension      Parkinsons disease (H)      Sepsis (H)        Social History:   Social History     Tobacco Use     Smoking status: Former Smoker     Packs/day: 0.00     Years: 0.00     Pack years: 0.00     Types: Cigarettes     Start date: 1965     Quit date: 1980     Years since quittin.2     Smokeless tobacco: Never Used   Substance Use Topics     Alcohol use: Never       Family History: History reviewed. No pertinent family history.    Allergies:   Allergies   Allergen Reactions     Hydromorphone Other (See Comments)     Mental change, hallucinations        Active Medications:   Current Outpatient Medications   Medication Sig Dispense Refill     carbidopa-levodopa (SINEMET)  MG tablet Take 1 tablet by mouth 3 times daily       cholecalciferol 1000 units TABS Take 1,000 Units by  "mouth every morning        citalopram (CELEXA) 20 MG tablet Take 20 mg by mouth daily  3     folic acid (FOLVITE) 1 MG tablet Take 1 mg by mouth every morning        gabapentin (NEURONTIN) 300 MG capsule TAKE 2 CAPSULES BY MOUTH THREE TIMES DAILY 180 capsule 0     lisinopril (PRINIVIL,ZESTRIL) 5 MG tablet Take 5 mg by mouth every morning        Multiple Vitamins-Minerals (MULTIVITAMIN WOMEN PO) Take 1 capsule by mouth every morning       oxybutynin ER (DITROPAN-XL) 5 MG 24 hr tablet Take 5 mg by mouth every morning        simvastatin (ZOCOR) 20 MG tablet Take 20 mg by mouth At Bedtime        vitamin (B COMPLEX) tablet Take 1 tablet by mouth every morning          Systemic Review:   CONSTITUTIONAL: NEGATIVE for fever, chills, change in weight  ENT/MOUTH: NEGATIVE for ear, mouth and throat problems  RESP: NEGATIVE for significant cough or SOB  CV: NEGATIVE for chest pain, palpitations or peripheral edema    Physical Examination:   Vital Signs: BP (!) 150/77   Pulse 58   Temp 97.1  F (36.2  C) (Temporal)   Resp 16   Ht 1.549 m (5' 1\")   Wt 80.7 kg (178 lb)   SpO2 99%   BMI 33.63 kg/m    GENERAL: healthy, alert and no distress  NECK: no adenopathy, no asymmetry, masses, or scars  RESP: lungs clear to auscultation - no rales, rhonchi or wheezes  CV: regular rate and rhythm, normal S1 S2, no S3 or S4, no murmur, click or rub, no peripheral edema and peripheral pulses strong  ABDOMEN: soft, nontender, no hepatosplenomegaly, no masses and bowel sounds normal  MS: no gross musculoskeletal defects noted, no edema    Plan: Appropriate to proceed as scheduled.      Su Sen MD  3/23/2021          "

## 2021-06-14 DIAGNOSIS — M54.16 LUMBAR RADICULOPATHY: Primary | ICD-10-CM

## 2021-06-14 DIAGNOSIS — Z11.59 ENCOUNTER FOR SCREENING FOR OTHER VIRAL DISEASES: ICD-10-CM

## 2021-06-25 DIAGNOSIS — Z11.59 ENCOUNTER FOR SCREENING FOR OTHER VIRAL DISEASES: ICD-10-CM

## 2021-06-25 LAB
SARS-COV-2 RNA RESP QL NAA+PROBE: NORMAL
SPECIMEN SOURCE: NORMAL

## 2021-06-25 PROCEDURE — U0003 INFECTIOUS AGENT DETECTION BY NUCLEIC ACID (DNA OR RNA); SEVERE ACUTE RESPIRATORY SYNDROME CORONAVIRUS 2 (SARS-COV-2) (CORONAVIRUS DISEASE [COVID-19]), AMPLIFIED PROBE TECHNIQUE, MAKING USE OF HIGH THROUGHPUT TECHNOLOGIES AS DESCRIBED BY CMS-2020-01-R: HCPCS | Performed by: ANESTHESIOLOGY

## 2021-06-25 PROCEDURE — U0005 INFEC AGEN DETEC AMPLI PROBE: HCPCS | Performed by: ANESTHESIOLOGY

## 2021-06-29 ENCOUNTER — ANCILLARY PROCEDURE (OUTPATIENT)
Dept: RADIOLOGY | Facility: AMBULATORY SURGERY CENTER | Age: 76
End: 2021-06-29
Attending: ANESTHESIOLOGY
Payer: MEDICARE

## 2021-06-29 ENCOUNTER — HOSPITAL ENCOUNTER (OUTPATIENT)
Facility: AMBULATORY SURGERY CENTER | Age: 76
Discharge: HOME | End: 2021-06-29
Attending: ANESTHESIOLOGY | Admitting: ANESTHESIOLOGY
Payer: MEDICARE

## 2021-06-29 VITALS
SYSTOLIC BLOOD PRESSURE: 130 MMHG | DIASTOLIC BLOOD PRESSURE: 69 MMHG | HEART RATE: 52 BPM | WEIGHT: 173 LBS | OXYGEN SATURATION: 98 % | TEMPERATURE: 97.1 F | RESPIRATION RATE: 18 BRPM | BODY MASS INDEX: 32.66 KG/M2 | HEIGHT: 61 IN

## 2021-06-29 DIAGNOSIS — M54.16 LUMBAR RADICULOPATHY: ICD-10-CM

## 2021-06-29 DIAGNOSIS — R52 PAIN: ICD-10-CM

## 2021-06-29 PROCEDURE — 62323 NJX INTERLAMINAR LMBR/SAC: CPT

## 2021-06-29 RX ORDER — METHYLPREDNISOLONE ACETATE 40 MG/ML
INJECTION, SUSPENSION INTRA-ARTICULAR; INTRALESIONAL; INTRAMUSCULAR; SOFT TISSUE PRN
Status: DISCONTINUED | OUTPATIENT
Start: 2021-06-29 | End: 2021-06-29 | Stop reason: HOSPADM

## 2021-06-29 RX ORDER — BUPIVACAINE HYDROCHLORIDE 2.5 MG/ML
INJECTION, SOLUTION EPIDURAL; INFILTRATION; INTRACAUDAL PRN
Status: DISCONTINUED | OUTPATIENT
Start: 2021-06-29 | End: 2021-06-29 | Stop reason: HOSPADM

## 2021-06-29 RX ORDER — IOPAMIDOL 408 MG/ML
INJECTION, SOLUTION INTRATHECAL PRN
Status: DISCONTINUED | OUTPATIENT
Start: 2021-06-29 | End: 2021-06-29 | Stop reason: HOSPADM

## 2021-06-29 RX ORDER — LIDOCAINE HYDROCHLORIDE 10 MG/ML
INJECTION, SOLUTION EPIDURAL; INFILTRATION; INTRACAUDAL; PERINEURAL PRN
Status: DISCONTINUED | OUTPATIENT
Start: 2021-06-29 | End: 2021-06-29 | Stop reason: HOSPADM

## 2021-06-29 ASSESSMENT — MIFFLIN-ST. JEOR: SCORE: 1217.1

## 2021-06-29 NOTE — H&P
Tracey MEDRANO Nazia  [id number]  female  75 year old      Reason for procedure/surgery: lumbar radiculopathy    Patient Active Problem List   Diagnosis     Lumbar radiculopathy     Neuropathic pain       Past Surgical History:    Past Surgical History:   Procedure Laterality Date     ABDOMEN SURGERY       BIOPSY       COLONOSCOPY       GI SURGERY      lap band     GYN SURGERY      hysterectomy     INJECT EPIDURAL LUMBAR N/A 2020    Procedure: Left Lumbar 5- Sacral 1 epidural steroid injection with fluoroscopy;  Surgeon: Su Sen MD;  [facility]     INJECT EPIDURAL LUMBAR Left 2021    Procedure: Left Lumbar 5- Sacral 1 epidural steroid injection with fluoroscopy;  Surgeon: Su Sne MD;  Location: [facility]     INJECT EPIDURAL LUMBAR Left 3/23/2021    Procedure: Left Lumbar 5- Sacral 1 epidural steroid injection with fluoroscopy;  Surgeon: Su Sen MD;  Location: [facility]     INJECT EPIDURAL LUMBAR / SACRAL SINGLE Left 2019    Procedure: Left Lumbar 5-sacral 1 interlaminar epidural steroid injection with fluoroscopy;  Surgeon: Su Sen MD;  [facility]     ORTHOPEDIC SURGERY      right shoulder replacement, left total knee replacement       Past Medical History:   Past Medical History:   Diagnosis Date     Arthritis      Cancer (H)      Diabetes (H)      Hypertension      Parkinsons disease (H)      Sepsis (H)        Social History:   Social History     Tobacco Use     Smoking status: Former Smoker     Packs/day: 0.00     Years: 0.00     Pack years: 0.00     Types: Cigarettes     Start date: 1965     Quit date: 1980     Years since quittin.5     Smokeless tobacco: Never Used   Substance Use Topics     Alcohol use: Never       Family History: History reviewed. No pertinent family history.    Allergies:   Allergies   Allergen Reactions     Hydromorphone Other (See Comments)     Mental change, hallucinations        Active Medications:   Current Outpatient  "Medications   Medication Sig Dispense Refill     carbidopa-levodopa (SINEMET)  MG tablet Take 1 tablet by mouth 3 times daily       cholecalciferol 1000 units TABS Take 1,000 Units by mouth every morning        citalopram (CELEXA) 20 MG tablet Take 20 mg by mouth daily  3     folic acid (FOLVITE) 1 MG tablet Take 1 mg by mouth every morning        gabapentin (NEURONTIN) 300 MG capsule TAKE 2 CAPSULES BY MOUTH THREE TIMES DAILY 180 capsule 0     lisinopril (PRINIVIL,ZESTRIL) 5 MG tablet Take 5 mg by mouth every morning        Multiple Vitamins-Minerals (MULTIVITAMIN WOMEN PO) Take 1 capsule by mouth every morning       oxybutynin ER (DITROPAN-XL) 5 MG 24 hr tablet Take 5 mg by mouth every morning        simvastatin (ZOCOR) 20 MG tablet Take 20 mg by mouth At Bedtime        vitamin (B COMPLEX) tablet Take 1 tablet by mouth every morning          Systemic Review:   CONSTITUTIONAL: NEGATIVE for fever, chills, change in weight  ENT/MOUTH: NEGATIVE for ear, mouth and throat problems  RESP: NEGATIVE for significant cough or SOB  CV: NEGATIVE for chest pain, palpitations or peripheral edema    Physical Examination:   Vital Signs: BP (!) 140/67   Pulse 56   Temp 97.1  F (36.2  C) (Tympanic)   Ht 1.549 m (5' 1\")   Wt 78.5 kg (173 lb)   SpO2 98%   BMI 32.69 kg/m    GENERAL: healthy, alert and no distress  NECK: no adenopathy, no asymmetry, masses, or scars  RESP: lungs clear to auscultation - no rales, rhonchi or wheezes  CV: regular rate and rhythm, normal S1 S2, no S3 or S4, no murmur, click or rub, no peripheral edema and peripheral pulses strong  ABDOMEN: soft, nontender, no hepatosplenomegaly, no masses and bowel sounds normal  MS: no gross musculoskeletal defects noted, no edema    Plan: Appropriate to proceed as scheduled.      Su Sen MD  6/29/2021          "

## 2021-06-29 NOTE — PROCEDURES
Lumbar Epidural Steroid Injection     Procedure:  1. Lumbar epidural steroid injection at left L5-S1  2. Fluoroscopy for needle guidance     Pre-operative Diagnosis:  1. Intervertebral disc disorders w radiculopathy, lumbosacral region  2. Other intervertebral disc degeneration, lumbosacral region     Post-Operative Diagnosis:  1. Intervertebral disc disorders w radiculopathy, lumbosacral region  2. Other intervertebral disc degeneration, lumbosacral region     Anesthesia:  Local anesthesia     Staff: JACKIE Chester       Medical Indications:  The patient presents to the ambulatory surgery center today for the treatment of persistent pain. We believe that the pain is spinally mediated. The patient has been exhibiting symptoms consistent with lumbar intraspinal inflammation and radiculopathy. Symptoms have been persistent, disabling and intermittently severe. The patient has been evaluated in the pain clinic and scheduled today for a spinal injection to aid in the diagnosis and treatment of presumed spinal pain. The risks, benefits, potential complications, and alternatives were discussed with the patient as per the signed consent form, and informed consent was obtained. The patient has received information about the procedure and its risks. The physician personally confirmed the procedure, side, and site to be injected with the patient and marked the site in the pre-procedure area.     Description of Procedure:  An additional intraoperative timeout, specifically to confirm accurate localization, was conducted by the attending physician. The patient remained awake and alert throughout the procedure. The patient was placed in the prone position. The skin was prepped with chlorhexidine and sterile drapes were applied. The skin and soft tissues were anesthetized with lidocaine 1%. A 22 gauge 3.5 inch touhy needle epidural needle was inserted percutaneously and advanced under fluoroscopic guidance using AP, and  lateral projections. Using loss of resistance to air and a left sided interlaminar approach, the L5/S1 posterior epidural space was entered after the lamina was contacted with the epidural needle. No paraesthesias occurred and aspiration was negative. Epidurography and radiographic interpretation: Epidurography was performed by injection of Isovue 200 under live fluoroscopy. Multiple Xray images were obtained. Radiologic examination confirmed proper spread of the contrast medium within the epidural space. There was no evidence of intrathecal or intravascular runoff. The needle was injected with 40mg methylprednisolone mixed with 3 ml of 0.25% bupivacaine. The needle was removed after flushing with 1% lidocaine. A sterile bandage was applied. The patient did not experience any hemodynamic or neurologic sequelae. The patient was transferred to the recovery area. Post operative instructions were explained and given to the patient.       Complications:  No procedural or immediate post-procedural complications occurred and the patient was transferred to PACU in stable condition.  Procedure Images:     Post-Operative Plan:  The patient will monitor pain relief from today's procedure. They will call the clinic for any problems related to today's procedure. A copy of our written post-procedure instructions was provided. They will return to clinic as scheduled.

## 2021-06-29 NOTE — DISCHARGE INSTRUCTIONS
Home Care Instructions after an Epidural Steroid Pain Injection    A lumbar epidural steroid injection delivers steroid medication directly into the area that may be causing your lower back pain and/or leg pain. A cervical or thoracic epidural steroid injection delivers steroids into the epidural space surrounding spinal nerve roots to help relieve pain in the upper spine/neck.    Activity  -Rest today  -Do not work today  -Resume normal activity tomorrow  -DO NOT shower for 24 hours  -DO NOT remove bandaid for 24 hours    Pain  -You may experience soreness at the injection site for one or two days  -You may use an ice pack for 20 minutes every 2 hours for the first 24 hours  -You may use a heating pad after the first 24 hours  -You may use Tylenol (acetaminophen) every 4 hours or other pain medicines as     directed by your physician    You may experience numbness radiating into your legs or arms (depending on the procedure location). This numbness may last several hours. Until sensation returns to normal; please use caution in walking, climbing stairs, and stepping out of your vehicle, etc.      Common side effects of steroids:  Not everyone will experience corticosteroid side effects. If side effects are experienced, they will gradually subside in the 7-10 day period following an injection. Most common side effects include:  -Flushed face and/or chest  -Feeling of warmth, particularly in the face but could be an overall feeling of warmth  -Increased blood sugar in diabetic patients  -Menstrual irregularities my occur. If taking hormone-based birth control an alternate method of birth control is recommended  -Sleep disturbances and/or mood swings are possible  -Leg cramps    Please contact us if you have:  -Severe pain  -Fever more than 101.5 degrees Fahrenheit  -Signs of infection at the injection site (redness, swelling, or drainage)    If you have questions, please contact our office at [phone] between the  hours of 7:00 am and 3:00 pm Monday through Friday. After office hours you can contact the on call provider by dialing [phone]. If you need immediate attention, we recommend that you go to a hospital emergency room or dial [phone].

## 2021-09-07 ENCOUNTER — TELEPHONE (OUTPATIENT)
Dept: NEUROLOGY | Facility: CLINIC | Age: 76
End: 2021-09-07

## 2021-09-07 NOTE — TELEPHONE ENCOUNTER
Per Shara's note, pt is requesting assistance with rescheduling appointment. Encounter routed to clinic coordinators for assistance.     SUMIT NuñezN, RN

## 2022-03-07 ENCOUNTER — TELEPHONE (OUTPATIENT)
Dept: ANESTHESIOLOGY | Facility: CLINIC | Age: 77
End: 2022-03-07
Payer: MEDICARE

## 2022-03-07 NOTE — TELEPHONE ENCOUNTER
Returned call to pt and she would like to do another injection, sent message to Dr. Sen and will follow up with pt once recvd

## 2022-03-08 DIAGNOSIS — Z11.59 ENCOUNTER FOR SCREENING FOR OTHER VIRAL DISEASES: Primary | ICD-10-CM

## 2022-03-08 DIAGNOSIS — M54.16 LUMBAR RADICULOPATHY: Primary | ICD-10-CM

## 2022-03-08 NOTE — TELEPHONE ENCOUNTER
Patient is scheduled for procedure with Dr. Sen    Spoke with: Patient    Date of Procedure: 03-24-22    [place] [facility] [place]    Informed patient they will need an adult  Yes    Pre-procedure COVID-19 Test: 03-21-22 @ ([place])     Additional comments: N/A    Patient is aware pre-op RN will call 2-3 days prior to procedure with arrival time and instructions. Yes       Mari Polanco on 3/8/2022 at 9:26 AM

## 2022-03-13 ENCOUNTER — HEALTH MAINTENANCE LETTER (OUTPATIENT)
Age: 77
End: 2022-03-13

## 2022-03-24 ENCOUNTER — HOSPITAL ENCOUNTER (OUTPATIENT)
Facility: AMBULATORY SURGERY CENTER | Age: 77
Discharge: HOME | End: 2022-03-24
Attending: ANESTHESIOLOGY | Admitting: ANESTHESIOLOGY
Payer: MEDICARE

## 2022-03-24 ENCOUNTER — ANCILLARY PROCEDURE (OUTPATIENT)
Dept: RADIOLOGY | Facility: AMBULATORY SURGERY CENTER | Age: 77
End: 2022-03-24
Attending: ANESTHESIOLOGY
Payer: MEDICARE

## 2022-03-24 VITALS
BODY MASS INDEX: 30.21 KG/M2 | RESPIRATION RATE: 16 BRPM | DIASTOLIC BLOOD PRESSURE: 72 MMHG | OXYGEN SATURATION: 99 % | TEMPERATURE: 97.9 F | SYSTOLIC BLOOD PRESSURE: 127 MMHG | WEIGHT: 160 LBS | HEIGHT: 61 IN | HEART RATE: 56 BPM

## 2022-03-24 DIAGNOSIS — M54.16 LUMBAR RADICULOPATHY: ICD-10-CM

## 2022-03-24 PROCEDURE — 62323 NJX INTERLAMINAR LMBR/SAC: CPT

## 2022-03-24 PROCEDURE — 62323 NJX INTERLAMINAR LMBR/SAC: CPT | Performed by: ANESTHESIOLOGY

## 2022-03-24 RX ORDER — LISINOPRIL 10 MG/1
10 TABLET ORAL DAILY
COMMUNITY
Start: 2022-01-04

## 2022-03-24 RX ORDER — METHYLPREDNISOLONE ACETATE 40 MG/ML
INJECTION, SUSPENSION INTRA-ARTICULAR; INTRALESIONAL; INTRAMUSCULAR; SOFT TISSUE PRN
Status: DISCONTINUED | OUTPATIENT
Start: 2022-03-24 | End: 2022-03-24 | Stop reason: HOSPADM

## 2022-03-24 RX ORDER — BUPIVACAINE HYDROCHLORIDE 2.5 MG/ML
INJECTION, SOLUTION EPIDURAL; INFILTRATION; INTRACAUDAL PRN
Status: DISCONTINUED | OUTPATIENT
Start: 2022-03-24 | End: 2022-03-24 | Stop reason: HOSPADM

## 2022-03-24 RX ORDER — LIDOCAINE HYDROCHLORIDE 10 MG/ML
INJECTION, SOLUTION EPIDURAL; INFILTRATION; INTRACAUDAL; PERINEURAL PRN
Status: DISCONTINUED | OUTPATIENT
Start: 2022-03-24 | End: 2022-03-24 | Stop reason: HOSPADM

## 2022-03-24 RX ORDER — CITALOPRAM HYDROBROMIDE 40 MG/1
40 TABLET ORAL DAILY
COMMUNITY
Start: 2021-12-28

## 2022-03-24 RX ORDER — CARBIDOPA AND LEVODOPA 25; 100 MG/1; MG/1
TABLET ORAL
COMMUNITY
Start: 2022-01-08

## 2022-03-24 RX ORDER — IOPAMIDOL 408 MG/ML
INJECTION, SOLUTION INTRATHECAL PRN
Status: DISCONTINUED | OUTPATIENT
Start: 2022-03-24 | End: 2022-03-24 | Stop reason: HOSPADM

## 2022-03-24 NOTE — H&P
Tracey MEDRANO Nazia  [id number]  female  76 year old      Reason for procedure/surgery: lumbar radiculopathy    Patient Active Problem List   Diagnosis     Lumbar radiculopathy     Neuropathic pain       Past Surgical History:    Past Surgical History:   Procedure Laterality Date     ABDOMEN SURGERY       BIOPSY       COLONOSCOPY       GI SURGERY      lap band     GYN SURGERY      hysterectomy     INJECT EPIDURAL LUMBAR N/A 2020    Procedure: Left Lumbar 5- Sacral 1 epidural steroid injection with fluoroscopy;  Surgeon: Su Sen MD;  [facility]     INJECT EPIDURAL LUMBAR Left 2021    Procedure: Left Lumbar 5- Sacral 1 epidural steroid injection with fluoroscopy;  Surgeon: Su Sen MD;  Location: [facility]     INJECT EPIDURAL LUMBAR Left 3/23/2021    Procedure: Left Lumbar 5- Sacral 1 epidural steroid injection with fluoroscopy;  Surgeon: Su Sen MD;  Location: [facility]     INJECT EPIDURAL LUMBAR Left 2021    Procedure: Lumbar epidural steroid injection at left L5-S1 with fluoroscopy;  Surgeon: Su Sen MD;  Location: [facility]     INJECT EPIDURAL LUMBAR / SACRAL SINGLE Left 2019    Procedure: Left Lumbar 5-sacral 1 interlaminar epidural steroid injection with fluoroscopy;  Surgeon: Su Sen MD;  [facility]     ORTHOPEDIC SURGERY      right shoulder replacement, left total knee replacement       Past Medical History:   Past Medical History:   Diagnosis Date     Arthritis      Cancer (H)      Complication of anesthesia     made her hallucinate after     Diabetes (H)      Hypertension      Parkinsons disease (H)      Sepsis (H)        Social History:   Social History     Tobacco Use     Smoking status: Former Smoker     Packs/day: 0.00     Years: 0.00     Pack years: 0.00     Types: Cigarettes     Start date: 1965     Quit date: 1980     Years since quittin.2     Smokeless tobacco: Never Used   Substance Use Topics     Alcohol use: Never       Family  "History: History reviewed. No pertinent family history.    Allergies:   Allergies   Allergen Reactions     Hydromorphone Other (See Comments)     Mental change, hallucinations        Active Medications:   Current Outpatient Medications   Medication Sig Dispense Refill     carbidopa-levodopa (SINEMET)  MG tablet TAKE HALF TABLET BY MOUTH 3 TIMES DAILY       cholecalciferol 1000 units TABS Take 1,000 Units by mouth every morning        citalopram (CELEXA) 40 MG tablet Take 40 mg by mouth daily       folic acid (FOLVITE) 1 MG tablet Take 1 mg by mouth every morning        gabapentin (NEURONTIN) 300 MG capsule TAKE 2 CAPSULES BY MOUTH THREE TIMES DAILY 180 capsule 0     lisinopril (ZESTRIL) 10 MG tablet Take 10 mg by mouth daily       Multiple Vitamins-Minerals (MULTIVITAMIN WOMEN PO) Take 1 capsule by mouth every morning       oxybutynin ER (DITROPAN-XL) 5 MG 24 hr tablet Take 5 mg by mouth every morning        simvastatin (ZOCOR) 20 MG tablet Take 20 mg by mouth At Bedtime        vitamin (B COMPLEX) tablet Take 1 tablet by mouth every morning          Systemic Review:   CONSTITUTIONAL: NEGATIVE for fever, chills, change in weight  ENT/MOUTH: NEGATIVE for ear, mouth and throat problems  RESP: NEGATIVE for significant cough or SOB  CV: NEGATIVE for chest pain, palpitations or peripheral edema    Physical Examination:   Vital Signs: /63   Pulse 61   Temp 97.9  F (36.6  C) (Temporal)   Resp 16   Ht 1.549 m (5' 1\")   Wt 72.6 kg (160 lb)   SpO2 99%   BMI 30.23 kg/m    GENERAL: healthy, alert and no distress  NECK: no adenopathy, no asymmetry, masses, or scars  RESP: lungs clear to auscultation - no rales, rhonchi or wheezes  CV: regular rate and rhythm, normal S1 S2, no S3 or S4, no murmur, click or rub, no peripheral edema and peripheral pulses strong  ABDOMEN: soft, nontender, no hepatosplenomegaly, no masses and bowel sounds normal  MS: no gross musculoskeletal defects noted, no edema    Plan: " Appropriate to proceed as scheduled.      Su Sen MD  3/24/2022

## 2022-03-24 NOTE — DISCHARGE INSTRUCTIONS
PAIN INJECTION HOME CARE INSTRUCTIONS  Activity  -You may resume most normal activity levels with the exception of strenuous activity. It may help to move in ways that hurt before the injection, to see if the pain is still there, but do not overdo it. Take it easy for the rest of the day.    -DO NOT remove bandaid for 24 hours  -DO NOT shower for 24 hours      Pain  -You may feel immediate pain relief and numbness for a period of time after the injection. This may indicate the medication has reached the right spot.  -Your pain may return after this short pain-free period, or may even be a little worse for a day or two. It may be caused by needle irritation or by the medication itself. The medications usually take two or three days to start working, but can take as long as a week.    -You may use an ice pack for 20 minutes every 2 hours for the first 24 hours  -You may use a heating pad after the first 24 hours  -You may use Tylenol (acetaminophen) every 4 hours or other pain medicines as directed by your physician        DID YOU RECEIVE STEROIDS TODAY?  Yes    Common side effects of steroids:  Not everyone will experience corticosteroid side effects. If side effects are experienced, they will gradually subside in the 7-10 day period following an injection. Most common side effects include:  -Flushed face and/or chest  -Feeling of warmth, particularly in the face but could be an overall feeling of warmth  -Increased blood sugar in diabetic patients  -Menstrual irregularities my occur. If taking hormone-based birth control an alternate method of birth control is recommended  -Sleep disturbances and/or mood swings are possible  -Leg cramps    PLEASE KEEP TRACK OF YOUR SYMPTOMS AND NOTE ANY CHANGES FOR YOUR DOCTOR.       Please contact us if you have:  -Severe pain  -Fever more than 101.5 degrees Fahrenheit  -Signs of infection at the injection site (redness, swelling, or drainage)      FOR PAIN CENTER PATIENTS:  If you  have questions, please contact the Pain Clinic at [phone] Option #1 between the hours of 7:00 am and 3:00 pm Monday through Friday. After office hours you can contact the on call provider by dialing [phone]. If you need immediate attention, we recommend that you go to a hospital emergency room or dial [phone].

## 2022-05-23 ENCOUNTER — VIRTUAL VISIT (OUTPATIENT)
Dept: ANESTHESIOLOGY | Facility: CLINIC | Age: 77
End: 2022-05-23
Payer: MEDICARE

## 2022-05-23 DIAGNOSIS — M79.2 NEUROPATHIC PAIN: Primary | ICD-10-CM

## 2022-05-23 PROCEDURE — 99207 PR NO BILLABLE SERVICE THIS VISIT: CPT | Performed by: ANESTHESIOLOGY

## 2022-05-23 NOTE — LETTER
5/23/2022       RE: Tracey Mandujano  [address] [place] [address]     Dear Colleague,    Thank you for referring your patient, Tracey Mandujano, to the [facility] [place] at [facility]. Please see a copy of my visit note below.    The visit was cancelled.       Again, thank you for allowing me to participate in the care of your patient.      Sincerely,    Su Sen MD

## 2022-05-23 NOTE — LETTER
Date:June 9, 2022      Provider requested that no letter be sent. Do not send.       [facility]

## 2022-09-06 ENCOUNTER — TELEPHONE (OUTPATIENT)
Dept: ANESTHESIOLOGY | Facility: CLINIC | Age: 77
End: 2022-09-06

## 2022-09-08 ENCOUNTER — OFFICE VISIT (OUTPATIENT)
Dept: ANESTHESIOLOGY | Facility: CLINIC | Age: 77
End: 2022-09-08
Payer: MEDICARE

## 2022-09-08 ENCOUNTER — TELEPHONE (OUTPATIENT)
Dept: ANESTHESIOLOGY | Facility: CLINIC | Age: 77
End: 2022-09-08

## 2022-09-08 VITALS — DIASTOLIC BLOOD PRESSURE: 74 MMHG | SYSTOLIC BLOOD PRESSURE: 120 MMHG | HEART RATE: 56 BPM | OXYGEN SATURATION: 98 %

## 2022-09-08 DIAGNOSIS — M54.16 LUMBAR RADICULOPATHY: Primary | ICD-10-CM

## 2022-09-08 PROCEDURE — 99214 OFFICE O/P EST MOD 30 MIN: CPT | Performed by: ANESTHESIOLOGY

## 2022-09-08 ASSESSMENT — PAIN SCALES - GENERAL: PAINLEVEL: EXTREME PAIN (8)

## 2022-09-08 NOTE — NURSING NOTE
RN read through the instructions with the patient for the recommended procedure: LESI  Patient verbalized understanding to holding appropriate medication per protocol and was agreeable to NPO policy and needing a .    Anticoagulant: None reported    Recommended Follow Up: as needed    Soledad Gonzalez RN

## 2022-09-08 NOTE — PROGRESS NOTES
Pain clinic follow up note    HPI:   Tracey Mandujano is a 77 year old year old female  who presents to the pain clinic with recurrence of pain. I also spoke to the patient's daughter Stephanie over the phone as per the patients requests.     Patient Supplied Answers To the [facility] Pain Questionnaire  [facility] -  Patient Entered Questionnaire/Answers 11/3/2019   What number best describes your pain right now:  0 = No pain  to  10 = Worst pain imaginable 7   How would you describe the pain? sharp, numbness, dull, aching, pressure   Which of the following worsen your pain? standing, walking   Which of the following improve or reduce your pain? lying down   What number best describes your average pain for the past week:  0 = No pain  to  10 = Worst pain imaginable 7   What number best describes your LOWEST pain in past 24 hours:  0 = No pain  to  10 = Worst pain imaginable 3   What number best describes your WORST pain in past 24 hours:  0 = No pain  to  10 = Worst pain imaginable 7   When is your pain worst? Constant   What non-medicine treatments have you already had for your pain? physical therapy, acupuncture, chiropractic care, spine injections (shots)   Have you tried treating your pain with medication?  Yes   Are you currently taking medications for your pain? Yes       Tracey is 77 years old female presenting to the clinic with a recent diagnosis of parkinsons. She is on oral medications for parkinsons. She finds relief with epidural steroid injection. She feels over 50% pain for 3 + months. She is interested in repeating the epidural steroid injections.       ROS:  Review of Systems   All other systems reviewed and are negative.    Significant Medical History:   Past Medical History:   Diagnosis Date     Arthritis      Cancer (H)      Complication of anesthesia     made her hallucinate after     Diabetes (H)      Hypertension      Parkinsons disease (H)      Sepsis (H)           Past Surgical History:  Past Surgical  History:   Procedure Laterality Date     ABDOMEN SURGERY       BIOPSY       COLONOSCOPY       GI SURGERY      lap band     GYN SURGERY      hysterectomy     INJECT EPIDURAL LUMBAR N/A 2020    Procedure: Left Lumbar 5- Sacral 1 epidural steroid injection with fluoroscopy;  Surgeon: Su Sen MD;  [facility]     INJECT EPIDURAL LUMBAR Left 2021    Procedure: Left Lumbar 5- Sacral 1 epidural steroid injection with fluoroscopy;  Surgeon: Su Sen MD;  [facility]     INJECT EPIDURAL LUMBAR Left 3/23/2021    Procedure: Left Lumbar 5- Sacral 1 epidural steroid injection with fluoroscopy;  Surgeon: Su Sen MD;  [facility]     INJECT EPIDURAL LUMBAR Left 2021    Procedure: Lumbar epidural steroid injection at left L5-S1 with fluoroscopy;  Surgeon: Su Sen MD;  Location[facility]     INJECT EPIDURAL LUMBAR Left 3/24/2022    Procedure: Lumbar epidural steroid injection at left L5-S1;  Surgeon: Su Sen MD;  Location[facility]     INJECT EPIDURAL LUMBAR / SACRAL SINGLE Left 2019    Procedure: Left Lumbar 5-sacral 1 interlaminar epidural steroid injection with fluoroscopy;  Surgeon: Su Sen MD;  [facility]     ORTHOPEDIC SURGERY      right shoulder replacement, left total knee replacement          Family History:  No family history on file.       Social History:  Social History     Socioeconomic History     Marital status:      Spouse name: Not on file     Number of children: Not on file     Years of education: Not on file     Highest education level: Not on file   Occupational History     Not on file   Tobacco Use     Smoking status: Former Smoker     Packs/day: 0.00     Years: 0.00     Pack years: 0.00     Types: Cigarettes     Start date: 1965     Quit date: 1980     Years since quittin.7     Smokeless tobacco: Never Used   Substance and Sexual Activity     Alcohol use: Never     Drug use: Never     Sexual activity: Not on  file   Other Topics Concern     Parent/sibling w/ CABG, MI or angioplasty before 65F 55M? No   Social History Narrative     Not on file     Social Determinants of Health     Financial Resource Strain: Not on file   Food Insecurity: Not on file   Transportation Needs: Not on file   Physical Activity: Not on file   Stress: Not on file   Social Connections: Not on file   Intimate Partner Violence: Not on file   Housing Stability: Not on file     Social History     Social History Narrative     Not on file          Allergies:  Allergies   Allergen Reactions     Hydromorphone Other (See Comments)     Mental change, hallucinations        Current Medications:   Current Outpatient Medications   Medication Sig Dispense Refill     carbidopa-levodopa (SINEMET)  MG tablet TAKE HALF TABLET BY MOUTH 3 TIMES DAILY       cholecalciferol 1000 units TABS Take 1,000 Units by mouth every morning        citalopram (CELEXA) 40 MG tablet Take 40 mg by mouth daily       folic acid (FOLVITE) 1 MG tablet Take 1 mg by mouth every morning        gabapentin (NEURONTIN) 300 MG capsule TAKE 2 CAPSULES BY MOUTH THREE TIMES DAILY 180 capsule 0     lisinopril (ZESTRIL) 10 MG tablet Take 10 mg by mouth daily       Multiple Vitamins-Minerals (MULTIVITAMIN WOMEN PO) Take 1 capsule by mouth every morning       oxybutynin ER (DITROPAN-XL) 5 MG 24 hr tablet Take 5 mg by mouth every morning        simvastatin (ZOCOR) 20 MG tablet Take 20 mg by mouth At Bedtime        vitamin (B COMPLEX) tablet Take 1 tablet by mouth every morning          Physical Exam:     /74 (BP Location: Left arm, Patient Position: Chair, Cuff Size: Adult Regular)   Pulse 56   SpO2 98%     General Appearance: No distress, seated comfortably  Mood: Euthymic  HE ENT: Non constricted pupils  Respiratory: Non labored breathing    ASSESSMENT AND PLAN:     Encounter Diagnosis:  1. Left L4/5 lumbar radiculopathy  2. Lumbar facet mediated pain  3. Peripheral neuropathic pain  4.  Theron Mandujano is a 77 year old year old female who presents to the pain clinic with low back pain and recent diagnosis of parkinsons    RECOMMENDATIONS:     1. Medications: We are prescribing the patient to continue her medication as per pcp. No changes were made today. Dosing, side effects, risks/benefits/alternatives were discussed with the patient in detail.    2. Procedure: We are scheduling the patient for left L5- S1 epidural steroid injection with fluoroscopy in the procedure suite. Risks/benefits/alternatives were discussed.     Follow up: as needed.

## 2022-09-08 NOTE — TELEPHONE ENCOUNTER
[facility]    Phone Message    May a detailed message be left on voicemail: yes     Reason for Call: Pt's daughter Stephanie is requesting a call back to explain to her why her Mom didn't get an injection today.  Thanks.

## 2022-09-08 NOTE — PATIENT INSTRUCTIONS
Procedures:    Call to schedule your procedure: [phone]    Case Request: L5- S1 epidural steroid injection with fluoroscopy (left>Right).    Your pre-procedure instructions are below, please call our clinic if you have any questions.        Recommended Follow up:      Follow up as needed.           To speak with a nurse, schedule/reschedule/cancel a clinic appointment, or request a medication refill call: [phone]    You can also reach us by [employer]: [email or web address]       Procedure Information related to COVID-19     Please call [phone] to schedule, reschedule, or cancel your procedure appointment.   Phones are answered Monday - Friday from 08:00 - 4:30pm.  Leave a voicemail with your name, birth date, and phone number if no one is available to take your call.         You will need to be tested for COVID-19 before your procedure. If you're going home on the same day as your procedure (surgery), you may take an at-home rapid antigen test 1 to 2 days before your procedure. If your test is negative, please take a photo of the test. Show the photo to the nurse when you come in for your procedure. If your test is positive, please update our office right away and your procedure may have to be postponed.     If you do not have access to an at-home rapid test, you will have to be tested at a lab within 4 days of your procedure.  You will be called to schedule your COVID test by a central scheduling team. If you have not been contacted to schedule your test within 4 days of the scheduled procedure, call [phone]     Please be aware that the turn around time for the test is approximately 24-72 hours.   If your results are still pending the day of your procedure, you will be notified as soon as possible as the procedure may be cancelled.     Please note: You will only be contacted for positive and pending results.      The procedure center staff will call you several days before the  procedure to review important information that you will need to know for the day of the procedure.   Please contact the clinic if you have further questions about this information.         Information related to Scheduling and Pre-Procedure Instructions:    If you must reschedule your procedure more than two times, you must follow up in clinic before rescheduling again.    Preparing for your procedure    CAUTION - FAILURE TO FOLLOW THESE PRE-PROCEDURE INSTRUCTIONS WILL RESULT IN YOUR PROCEDURE BEING RESCHEDULED.    Your Procedure: Case Request: L5- S1 epidural steroid injection with fluoroscopy (left>Right)            You must have a  take you home after your procedure. Transportation by taxi or para-transit is okay as long as you have a responsible adult accompany you. You must provide your 's full name and contact number at time of check in.     Fasting Protocol Please have nothing to eat and drink for 2 hours before the procedure.      Medications If you take any medications, DO NOT STOP. Take your medications as usual the day of your procedure with a sip of water AT LEAST 2 HOURS PRIOR TO ARRIVAL.    Antibiotics If you are currently taking antibiotics, you must complete the entire dose 7 days prior to your scheduled procedure. You must be clear of any signs or symptoms of infection. If you begin antibiotics, please contact our clinic for instructions.     Fever, Chills, or Rash If you experience a fever of higher than 100 degrees, chills, rash, or open wounds during the one week before your procedure, please call the clinic to see if you may proceed with your procedure.      Medication Hold List  **Patients under Cardiology/Neurology care should consult their provider prior to the pain procedure to verify pre-procedure medication instructions. The information below contains general guidelines.**    Blood Thinners If you are taking daily ASPIRIN, PLAVIX, OR OTHER BLOOD THINNERS SUCH AS  COUMADIN/WARFARIN, we will need your prescribing doctor to sign a release permitting you to stop these medications. Once approved by your prescribing doctor - STOP ALL BLOOD THINNERS BASED ON THE TIME TABLE BELOW PRIOR TO YOUR PROCEDURE. If you have been instructed to stop WARFARIN(COUMADIN), you must have an INR lab drawn the day before your procedure. . Your INR must be within normal limits before we can perform your injection. MEDICATIONS CAN BE RESTARTED AFTER YOUR PROCEDURE.    14 DAY HOLD  Ticlid (ticlopidine)    10 DAY HOLD  Effient (Prasugel)    3 DAY HOLD  Xarelto (rivaroxaban) 7 DAY HOLD  Anacin, Bufferin, Ecotrin, Excedrin, Aggrenox (Aspirin)  Brilinta (ticagrelor)  Coumadin (Warfarin)  Pradexa (Dabigatran)  Elmiron (Pentosan)  Plavix (Clopidogrel Bisulfate)  Pletal (Cilostazol)    24 HOUR HOLD  Lovenox (enoxaparin)  Agrylin (Anagrelide)        Non-steroidal Anti-inflammatories (NSAIDs) DO NOT TAKE any non-steroidal anti-inflammatory medications (NSAIDs) listed on the table below. MEDICATIONS CAN BE RESTARTED AFTER YOUR PROCEDURE. Celebrex is OK to take and does not need to be discontinued.     Medications to stop:  3 DAY HOLD  Advil, Motrin (Ibuprofen)  Arthrotec (diciofenac sodium/misoprostol)  Clinoril (Sulindac)  Indocin (Indomethacin)  Lodine (Etodolac)  Toradol (Ketorolac)  Vicoprofen (Hydrocodone and Ibuprofen)  Voltaren (Diclotenac)    14 DAY HOLD  Daypro (Oxaprozin)  Feldene (Piroxicam)   7 DAY HOLD  Aleve (Naproxen sodium)  Darvon compound (contains aspirin)  Naprosyn (Naproxen)  Norgesic Forte (contains aspirin)  Mobic (Meloxicam)  Oruvall (Ketoprofen)  Percodan (contains aspirin)  Relafen (Nabumetone)  Salsalate  Trilisate  Vitamin E (more than 400 mg per day)  Any medication containing aspirin                To speak with a nurse, schedule/reschedule/cancel a clinic appointment, or request a medication refill call: [phone]    You can also reach us by [employer]:  [email or web address]

## 2022-09-08 NOTE — LETTER
9/8/2022       RE: Tracey Mandujano  [address] [place] [address]     Dear Colleague,    Thank you for referring your patient, Tracey Mandujano, to the [facility] [place] at [facility]. Please see a copy of my visit note below.    Pain clinic follow up note    HPI:   Tracey Mandujano is a 77 year old year old female  who presents to the pain clinic with recurrence of pain. I also spoke to the patient's daughter Stephanie over the phone as per the patients requests.     Patient Supplied Answers To the [facility] Pain Questionnaire  [facility] -  Patient Entered Questionnaire/Answers 11/3/2019   What number best describes your pain right now:  0 = No pain  to  10 = Worst pain imaginable 7   How would you describe the pain? sharp, numbness, dull, aching, pressure   Which of the following worsen your pain? standing, walking   Which of the following improve or reduce your pain? lying down   What number best describes your average pain for the past week:  0 = No pain  to  10 = Worst pain imaginable 7   What number best describes your LOWEST pain in past 24 hours:  0 = No pain  to  10 = Worst pain imaginable 3   What number best describes your WORST pain in past 24 hours:  0 = No pain  to  10 = Worst pain imaginable 7   When is your pain worst? Constant   What non-medicine treatments have you already had for your pain? physical therapy, acupuncture, chiropractic care, spine injections (shots)   Have you tried treating your pain with medication?  Yes   Are you currently taking medications for your pain? Yes       Tracey is 77 years old female presenting to the clinic with a recent diagnosis of parkinsons. She is on oral medications for parkinsons. She finds relief with epidural steroid injection. She feels over 50% pain for 3 + months. She is interested in repeating the epidural steroid injections.       ROS:  Review of  Systems   All other systems reviewed and are negative.    Significant Medical History:   Past Medical History:   Diagnosis Date     Arthritis      Cancer (H)      Complication of anesthesia     made her hallucinate after     Diabetes (H)      Hypertension      Parkinsons disease (H)      Sepsis (H)           Past Surgical History:  Past Surgical History:   Procedure Laterality Date     ABDOMEN SURGERY       BIOPSY       COLONOSCOPY       GI SURGERY      lap band     GYN SURGERY      hysterectomy     INJECT EPIDURAL LUMBAR N/A 9/8/2020    Procedure: Left Lumbar 5- Sacral 1 epidural steroid injection with fluoroscopy;  Surgeon: Su Sen MD;  [facility]     INJECT EPIDURAL LUMBAR Left 1/26/2021    Procedure: Left Lumbar 5- Sacral 1 epidural steroid injection with fluoroscopy;  Surgeon: Su Sen MD;  [facility]     INJECT EPIDURAL LUMBAR Left 3/23/2021    Procedure: Left Lumbar 5- Sacral 1 epidural steroid injection with fluoroscopy;  Surgeon: Su Sen MD;  Location: [facility]     INJECT EPIDURAL LUMBAR Left 6/29/2021    Procedure: Lumbar epidural steroid injection at left L5-S1 with fluoroscopy;  Surgeon: Su Sen MD;  [facility]     INJECT EPIDURAL LUMBAR Left 3/24/2022    Procedure: Lumbar epidural steroid injection at left L5-S1;  Surgeon: Su Sen MD;  [facility]     INJECT EPIDURAL LUMBAR / SACRAL SINGLE Left 12/16/2019    Procedure: Left Lumbar 5-sacral 1 interlaminar epidural steroid injection with fluoroscopy;  Surgeon: Su Sen MD;  [facility]     ORTHOPEDIC SURGERY      right shoulder replacement, left total knee replacement          Family History:  No family history on file.       Social History:  Social History     Socioeconomic History     Marital status:      Spouse name: Not on file     Number of children: Not on file     Years of education: Not on file     Highest education level: Not on file   Occupational History     Not on file    Tobacco Use     Smoking status: Former Smoker     Packs/day: 0.00     Years: 0.00     Pack years: 0.00     Types: Cigarettes     Start date: 1965     Quit date: 1980     Years since quittin.7     Smokeless tobacco: Never Used   Substance and Sexual Activity     Alcohol use: Never     Drug use: Never     Sexual activity: Not on file   Other Topics Concern     Parent/sibling w/ CABG, MI or angioplasty before 65F 55M? No   Social History Narrative     Not on file     Social Determinants of Health     Financial Resource Strain: Not on file   Food Insecurity: Not on file   Transportation Needs: Not on file   Physical Activity: Not on file   Stress: Not on file   Social Connections: Not on file   Intimate Partner Violence: Not on file   Housing Stability: Not on file     Social History     Social History Narrative     Not on file          Allergies:  Allergies   Allergen Reactions     Hydromorphone Other (See Comments)     Mental change, hallucinations        Current Medications:   Current Outpatient Medications   Medication Sig Dispense Refill     carbidopa-levodopa (SINEMET)  MG tablet TAKE HALF TABLET BY MOUTH 3 TIMES DAILY       cholecalciferol 1000 units TABS Take 1,000 Units by mouth every morning        citalopram (CELEXA) 40 MG tablet Take 40 mg by mouth daily       folic acid (FOLVITE) 1 MG tablet Take 1 mg by mouth every morning        gabapentin (NEURONTIN) 300 MG capsule TAKE 2 CAPSULES BY MOUTH THREE TIMES DAILY 180 capsule 0     lisinopril (ZESTRIL) 10 MG tablet Take 10 mg by mouth daily       Multiple Vitamins-Minerals (MULTIVITAMIN WOMEN PO) Take 1 capsule by mouth every morning       oxybutynin ER (DITROPAN-XL) 5 MG 24 hr tablet Take 5 mg by mouth every morning        simvastatin (ZOCOR) 20 MG tablet Take 20 mg by mouth At Bedtime        vitamin (B COMPLEX) tablet Take 1 tablet by mouth every morning          Physical Exam:     /74 (BP Location: Left arm, Patient Position:  Chair, Cuff Size: Adult Regular)   Pulse 56   SpO2 98%     General Appearance: No distress, seated comfortably  Mood: Euthymic  HE ENT: Non constricted pupils  Respiratory: Non labored breathing    ASSESSMENT AND PLAN:     Encounter Diagnosis:  1. Left L4/5 lumbar radiculopathy  2. Lumbar facet mediated pain  3. Peripheral neuropathic pain  4. Parkinsons       Tracey Mandujano is a 77 year old year old female who presents to the pain clinic with low back pain and recent diagnosis of parkinsons    RECOMMENDATIONS:     1. Medications: We are prescribing the patient to continue her medication as per pcp. No changes were made today. Dosing, side effects, risks/benefits/alternatives were discussed with the patient in detail.    2. Procedure: We are scheduling the patient for left L5- S1 epidural steroid injection with fluoroscopy in the procedure suite. Risks/benefits/alternatives were discussed.     Follow up: as needed.           Sincerely,    Su Sen MD

## 2022-09-08 NOTE — NURSING NOTE
Patient presents with:  Follow Up: Follow-up Lower Back pain level 8/10      Extreme Pain (8)         What medications are you using for pain? Tylenol, Gabapentin    (New patients only) Have you been seen by another pain clinic/ provider? no    (Return Patients only) What refills are you needing today? no

## 2022-09-09 NOTE — TELEPHONE ENCOUNTER
Patient is scheduled for procedure with Dr. Sen    Spoke with: Patients daughter(Stephanie)    Date of Procedure: 09-13-22    Location: [facility] [place]    Informed patient they will need an adult  Yes    Pre-procedure COVID-19 Test: @ home    Additional comments: Arrival 12 pm    Patient is aware pre-op RN will call 2-3 days prior to procedure with arrival time and instructions. yes      Mari Polanco on 9/9/2022 at 12:21 PM

## 2022-09-09 NOTE — TELEPHONE ENCOUNTER
[facility] Call [facility]    Phone Message    May a detailed message be left on voicemail: yes     Reason for Call: Other: Patients daughter is wanting to know why the patient did not receive an injection at her appt on 9/8/2022. Please reach out to Stephanie at [phone] Thank you      Action Taken: Message routed to:  [facility] PA[facility]     Travel Screening: Not Applicable

## 2022-09-09 NOTE — TELEPHONE ENCOUNTER
RN returned call to patient's daughter, Stephanie. Daughter upset that patient didn't have procedure done yesterday 9/8/22 with Dr. Sen. Patient was seen in clinic with Dr. Sen yesterday and a procedure was ordered. Daughter and patient thought the appointment was for an injection. RN explained that a procedure order needed to be placed first and our clinic would be reaching out to schedule. Writer apologized for any miscommunication regarding the appointment yesterday. RN informed procedure  to reach out to daughter to schedule epidural.    Soledad Gonzalez RN

## 2022-09-13 ENCOUNTER — HOSPITAL ENCOUNTER (OUTPATIENT)
Facility: AMBULATORY SURGERY CENTER | Age: 77
Discharge: HOME | End: 2022-09-13
Attending: ANESTHESIOLOGY | Admitting: ANESTHESIOLOGY
Payer: MEDICARE

## 2022-09-13 VITALS
DIASTOLIC BLOOD PRESSURE: 58 MMHG | OXYGEN SATURATION: 98 % | TEMPERATURE: 98.1 F | HEART RATE: 55 BPM | SYSTOLIC BLOOD PRESSURE: 115 MMHG | RESPIRATION RATE: 16 BRPM

## 2022-09-13 PROCEDURE — 62323 NJX INTERLAMINAR LMBR/SAC: CPT | Performed by: ANESTHESIOLOGY

## 2022-09-13 PROCEDURE — 62323 NJX INTERLAMINAR LMBR/SAC: CPT

## 2022-09-13 RX ORDER — METHYLPREDNISOLONE ACETATE 40 MG/ML
INJECTION, SUSPENSION INTRA-ARTICULAR; INTRALESIONAL; INTRAMUSCULAR; SOFT TISSUE PRN
Status: DISCONTINUED | OUTPATIENT
Start: 2022-09-13 | End: 2022-09-13 | Stop reason: HOSPADM

## 2022-09-13 RX ORDER — LIDOCAINE HYDROCHLORIDE 10 MG/ML
INJECTION, SOLUTION EPIDURAL; INFILTRATION; INTRACAUDAL; PERINEURAL PRN
Status: DISCONTINUED | OUTPATIENT
Start: 2022-09-13 | End: 2022-09-13 | Stop reason: HOSPADM

## 2022-09-13 RX ORDER — IOPAMIDOL 408 MG/ML
INJECTION, SOLUTION INTRATHECAL PRN
Status: DISCONTINUED | OUTPATIENT
Start: 2022-09-13 | End: 2022-09-13 | Stop reason: HOSPADM

## 2022-09-13 RX ORDER — BUPIVACAINE HYDROCHLORIDE 2.5 MG/ML
INJECTION, SOLUTION EPIDURAL; INFILTRATION; INTRACAUDAL PRN
Status: DISCONTINUED | OUTPATIENT
Start: 2022-09-13 | End: 2022-09-13 | Stop reason: HOSPADM

## 2022-09-13 NOTE — DISCHARGE INSTRUCTIONS
Home Care Instructions after an Epidural Steroid Pain Injection    A lumbar epidural steroid injection delivers steroid medication directly into the area that may be causing your lower back pain and/or leg pain. A cervical or thoracic epidural steroid injection delivers steroids into the epidural space surrounding spinal nerve roots to help relieve pain in the upper spine/neck.    Activity  -Rest today  -Do not work today  -Resume normal activity tomorrow  -DO NOT shower for 24 hours  -DO NOT remove bandaid for 24 hours    Pain  -You may experience soreness at the injection site for one or two days  -You may use an ice pack for 20 minutes every 2 hours for the first 24 hours  -You may use a heating pad after the first 24 hours  -You may use Tylenol (acetaminophen) every 4 hours or other pain medicines as     directed by your physician    You may experience numbness radiating into your legs or arms (depending on the procedure location). This numbness may last several hours. Until sensation returns to normal; please use caution in walking, climbing stairs, and stepping out of your vehicle, etc.    Common side effects of steroids:  Not everyone will experience corticosteroid side effects. If side effects are experienced, they will gradually subside in the 7-10 day period following an injection. Most common side effects include:  -Flushed face and/or chest  -Feeling of warmth, particularly in the face but could be an overall feeling of warmth  -Increased blood sugar in diabetic patients  -Menstrual irregularities my occur. If taking hormone-based birth control an alternate method of birth control is recommended  -Sleep disturbances and/or mood swings are possible  -Leg cramps    Please contact us if you have:  -Severe pain  -Fever more than 101.5 degrees Fahrenheit  -Signs of infection at the injection site (redness, swelling, or drainage)    If you have questions, please contact our office at [phone] between the  hours of 7:00 am and 3:00 pm Monday through Friday. After office hours you can contact the on call provider by dialing [phone]. If you need immediate attention, we recommend that you go to a hospital emergency room or dial [phone].

## 2022-09-13 NOTE — H&P
Tracey MEDRANO Nazia  [id number]  female  77 year old      Reason for procedure/surgery: lumbar radiculopathy    Patient Active Problem List   Diagnosis     Lumbar radiculopathy     Neuropathic pain       Past Surgical History:    Past Surgical History:   Procedure Laterality Date     ABDOMEN SURGERY       BIOPSY       COLONOSCOPY       GI SURGERY      lap band     GYN SURGERY      hysterectomy     INJECT EPIDURAL LUMBAR N/A 9/8/2020    Procedure: Left Lumbar 5- Sacral 1 epidural steroid injection with fluoroscopy;  Surgeon: Su Sen MD;  [facility]     INJECT EPIDURAL LUMBAR Left 1/26/2021    Procedure: Left Lumbar 5- Sacral 1 epidural steroid injection with fluoroscopy;  Surgeon: Su Sen MD;  Location: [facility]     INJECT EPIDURAL LUMBAR Left 3/23/2021    Procedure: Left Lumbar 5- Sacral 1 epidural steroid injection with fluoroscopy;  Surgeon: Su Sen MD;  Location: [facility]     INJECT EPIDURAL LUMBAR Left 6/29/2021    Procedure: Lumbar epidural steroid injection at left L5-S1 with fluoroscopy;  Surgeon: Su Sen MD;  Location: [facility]     INJECT EPIDURAL LUMBAR Left 3/24/2022    Procedure: Lumbar epidural steroid injection at left L5-S1;  Surgeon: Su Sen MD;  Location: [facility]     INJECT EPIDURAL LUMBAR / SACRAL SINGLE Left 12/16/2019    Procedure: Left Lumbar 5-sacral 1 interlaminar epidural steroid injection with fluoroscopy;  Surgeon: Su Sen MD;  [facility]     ORTHOPEDIC SURGERY      right shoulder replacement, left total knee replacement       Past Medical History:   Past Medical History:   Diagnosis Date     Arthritis      Cancer (H)      Complication of anesthesia     made her hallucinate after     Diabetes (H)      Hypertension      Parkinsons disease (H)      Sepsis (H)        Social History:   Social History     Tobacco Use     Smoking status: Former Smoker     Packs/day: 0.00     Years: 0.00     Pack years: 0.00     Types: Cigarettes     Start date:  1965     Quit date: 1980     Years since quittin.7     Smokeless tobacco: Never Used   Substance Use Topics     Alcohol use: Not Currently       Family History: History reviewed. No pertinent family history.    Allergies:   Allergies   Allergen Reactions     Hydromorphone Other (See Comments)     Mental change, hallucinations        Active Medications:   Current Outpatient Medications   Medication Sig Dispense Refill     carbidopa-levodopa (SINEMET)  MG tablet TAKE HALF TABLET BY MOUTH 3 TIMES DAILY       cholecalciferol 1000 units TABS Take 1,000 Units by mouth every morning        citalopram (CELEXA) 40 MG tablet Take 40 mg by mouth daily       folic acid (FOLVITE) 1 MG tablet Take 1 mg by mouth every morning        gabapentin (NEURONTIN) 300 MG capsule TAKE 2 CAPSULES BY MOUTH THREE TIMES DAILY 180 capsule 0     lisinopril (ZESTRIL) 10 MG tablet Take 10 mg by mouth daily       Multiple Vitamins-Minerals (MULTIVITAMIN WOMEN PO) Take 1 capsule by mouth every morning       oxybutynin ER (DITROPAN-XL) 5 MG 24 hr tablet Take 5 mg by mouth every morning        simvastatin (ZOCOR) 20 MG tablet Take 20 mg by mouth At Bedtime        vitamin (B COMPLEX) tablet Take 1 tablet by mouth every morning          Systemic Review:   CONSTITUTIONAL: NEGATIVE for fever, chills, change in weight  ENT/MOUTH: NEGATIVE for ear, mouth and throat problems  RESP: NEGATIVE for significant cough or SOB  CV: NEGATIVE for chest pain, palpitations or peripheral edema    Physical Examination:   Vital Signs: BP (!) 148/74 (BP Location: Right arm)   Pulse 54   Temp (!) 96.2  F (35.7  C) (Temporal)   Resp 14   SpO2 97%   GENERAL: healthy, alert and no distress  NECK: no adenopathy, no asymmetry, masses, or scars  RESP: lungs clear to auscultation - no rales, rhonchi or wheezes  CV: regular rate and rhythm, normal S1 S2, no S3 or S4, no murmur, click or rub, no peripheral edema and peripheral pulses strong  ABDOMEN: soft,  nontender, no hepatosplenomegaly, no masses and bowel sounds normal  MS: no gross musculoskeletal defects noted, no edema    Plan: Appropriate to proceed as scheduled.      Su Sen MD  9/13/2022

## 2022-10-24 NOTE — TELEPHONE ENCOUNTER
[facility] Call Center    Phone Message    May a detailed message be left on voicemail: yes     Reason for Call: Other: PT at [facility] would like PT orders faxed to her at [phone]     Action Taken: Message routed to:  [facility] Pain    Travel Screening: Not Applicable                                                                       5

## 2023-01-14 ENCOUNTER — HEALTH MAINTENANCE LETTER (OUTPATIENT)
Age: 78
End: 2023-01-14

## 2023-04-23 ENCOUNTER — HEALTH MAINTENANCE LETTER (OUTPATIENT)
Age: 78
End: 2023-04-23

## 2023-06-09 ENCOUNTER — TELEPHONE (OUTPATIENT)
Dept: ANESTHESIOLOGY | Facility: CLINIC | Age: 78
End: 2023-06-09
Payer: MEDICARE

## 2023-06-09 NOTE — TELEPHONE ENCOUNTER
The patient called to schedule an injection with Dr. Sen. Patient last seen in September 2022, and there isn't a case request. Please place the CR or contact the patient to follow up when able.    Crow Sands on 6/9/2023 at 1:38 PM

## 2023-06-09 NOTE — TELEPHONE ENCOUNTER
RN called and spoke with patient and she would like to repeat L5- S1 epidural steroid injection with fluoroscopy (left>Right) that she had with Dr. Sen September 2022. Patient reports the injection was very helpful and her pain is starting to return now. Routing to Dr. Sen to advise on case request or clinic appointment if needed.    Soledad Gonzalez RN

## 2023-06-11 DIAGNOSIS — M54.16 LUMBAR RADICULOPATHY: Primary | ICD-10-CM

## 2023-06-13 NOTE — TELEPHONE ENCOUNTER
RN reviewed patient chart. Pre procedure instructions were reviewed with patient.  Shwetha Armstrong RNCC

## 2023-06-13 NOTE — TELEPHONE ENCOUNTER
Patient is scheduled with Dr. Sen   Spoke with: the patient   Date of Procedure: 07/13   Location: [facility]   Informed patient they will need an adult : yes   Additional comments: n/a    Patient is aware pre-op RN will call 2-3 days prior to procedure with arrival time and instructions     Crow Sands on 6/13/2023 at 2:08 PM

## 2023-07-13 ENCOUNTER — ANCILLARY ORDERS (OUTPATIENT)
Dept: ANESTHESIOLOGY | Facility: CLINIC | Age: 78
End: 2023-07-13

## 2023-07-13 ENCOUNTER — HOSPITAL ENCOUNTER (OUTPATIENT)
Facility: AMBULATORY SURGERY CENTER | Age: 78
Discharge: HOME | End: 2023-07-13
Attending: ANESTHESIOLOGY | Admitting: ANESTHESIOLOGY
Payer: MEDICARE

## 2023-07-13 ENCOUNTER — ANCILLARY PROCEDURE (OUTPATIENT)
Dept: RADIOLOGY | Facility: AMBULATORY SURGERY CENTER | Age: 78
End: 2023-07-13
Attending: ANESTHESIOLOGY
Payer: MEDICARE

## 2023-07-13 VITALS
SYSTOLIC BLOOD PRESSURE: 138 MMHG | WEIGHT: 151 LBS | HEART RATE: 48 BPM | TEMPERATURE: 97.7 F | BODY MASS INDEX: 28.51 KG/M2 | RESPIRATION RATE: 16 BRPM | DIASTOLIC BLOOD PRESSURE: 67 MMHG | OXYGEN SATURATION: 100 % | HEIGHT: 61 IN

## 2023-07-13 DIAGNOSIS — M54.16 LUMBAR RADICULOPATHY: ICD-10-CM

## 2023-07-13 LAB — GLUCOSE BLDC GLUCOMTR-MCNC: 104 MG/DL (ref 70–99)

## 2023-07-13 PROCEDURE — 82962 GLUCOSE BLOOD TEST: CPT | Performed by: PATHOLOGY

## 2023-07-13 PROCEDURE — 62323 NJX INTERLAMINAR LMBR/SAC: CPT | Performed by: ANESTHESIOLOGY

## 2023-07-13 PROCEDURE — 62323 NJX INTERLAMINAR LMBR/SAC: CPT

## 2023-07-13 RX ORDER — METHYLPREDNISOLONE ACETATE 40 MG/ML
INJECTION, SUSPENSION INTRA-ARTICULAR; INTRALESIONAL; INTRAMUSCULAR; SOFT TISSUE PRN
Status: DISCONTINUED | OUTPATIENT
Start: 2023-07-13 | End: 2023-07-13 | Stop reason: HOSPADM

## 2023-07-13 RX ORDER — LIDOCAINE HYDROCHLORIDE 10 MG/ML
INJECTION, SOLUTION EPIDURAL; INFILTRATION; INTRACAUDAL; PERINEURAL PRN
Status: DISCONTINUED | OUTPATIENT
Start: 2023-07-13 | End: 2023-07-13 | Stop reason: HOSPADM

## 2023-07-13 RX ORDER — BUPIVACAINE HYDROCHLORIDE 2.5 MG/ML
INJECTION, SOLUTION EPIDURAL; INFILTRATION; INTRACAUDAL PRN
Status: DISCONTINUED | OUTPATIENT
Start: 2023-07-13 | End: 2023-07-13 | Stop reason: HOSPADM

## 2023-07-13 RX ORDER — IOPAMIDOL 408 MG/ML
INJECTION, SOLUTION INTRATHECAL PRN
Status: DISCONTINUED | OUTPATIENT
Start: 2023-07-13 | End: 2023-07-13 | Stop reason: HOSPADM

## 2023-07-13 NOTE — H&P
Tracey MEDRANO Nazia  [id number]  female  77 year old      Reason for procedure/surgery: lumbar radiculopathy    Patient Active Problem List   Diagnosis    Lumbar radiculopathy    Neuropathic pain       Past Surgical History:    Past Surgical History:   Procedure Laterality Date    ABDOMEN SURGERY      BIOPSY      COLONOSCOPY      GI SURGERY      lap band    GYN SURGERY      hysterectomy    INJECT EPIDURAL LUMBAR N/A 9/8/2020    Procedure: Left Lumbar 5- Sacral 1 epidural steroid injection with fluoroscopy;  Surgeon: Su Sen MD;  [facility]    INJECT EPIDURAL LUMBAR Left 1/26/2021    Procedure: Left Lumbar 5- Sacral 1 epidural steroid injection with fluoroscopy;  Surgeon: Su Sen MD;  Location: [facility]    INJECT EPIDURAL LUMBAR Left 3/23/2021    Procedure: Left Lumbar 5- Sacral 1 epidural steroid injection with fluoroscopy;  Surgeon: Su Sen MD;  Location: [facility]    INJECT EPIDURAL LUMBAR Left 6/29/2021    Procedure: Lumbar epidural steroid injection at left L5-S1 with fluoroscopy;  Surgeon: Su Sen MD;  Location: [facility]    INJECT EPIDURAL LUMBAR Left 3/24/2022    Procedure: Lumbar epidural steroid injection at left L5-S1;  Surgeon: Su Sen MD;  [facility]: [facility]    INJECT EPIDURAL LUMBAR N/A 9/13/2022    Procedure: L5- S1 epidural steroid injection with fluoroscopy (left>Right);  Surgeon: Su Sen MD;  [facility]: [facility]    INJECT EPIDURAL LUMBAR / SACRAL SINGLE Left 12/16/2019    Procedure: Left Lumbar 5-sacral 1 interlaminar epidural steroid injection with fluoroscopy;  Surgeon: Su Sen MD;  [facility]    ORTHOPEDIC SURGERY      right shoulder replacement, left total knee replacement       Past Medical History:   Past Medical History:   Diagnosis Date    Arthritis     Cancer (H)     Complication of anesthesia     made her hallucinate after    Diabetes (H)     Hypertension     Parkinsons disease (H)     Sepsis (H)        Social History:   Social History  "    Tobacco Use    Smoking status: Former     Packs/day: 0.00     Years: 0.00     Pack years: 0.00     Types: Cigarettes     Start date: 1965     Quit date: 1980     Years since quittin.5    Smokeless tobacco: Never   Substance Use Topics    Alcohol use: Not Currently       Family History: History reviewed. No pertinent family history.    Allergies:   Allergies   Allergen Reactions    Hydromorphone Other (See Comments)     Mental change, hallucinations        Active Medications:   Current Outpatient Medications   Medication Sig Dispense Refill    carbidopa-levodopa (SINEMET)  MG tablet TAKE HALF TABLET BY MOUTH 3 TIMES DAILY      cholecalciferol 1000 units TABS Take 1,000 Units by mouth every morning       citalopram (CELEXA) 40 MG tablet Take 40 mg by mouth daily      folic acid (FOLVITE) 1 MG tablet Take 1 mg by mouth every morning       gabapentin (NEURONTIN) 300 MG capsule TAKE 2 CAPSULES BY MOUTH THREE TIMES DAILY 180 capsule 0    Multiple Vitamins-Minerals (MULTIVITAMIN WOMEN PO) Take 1 capsule by mouth every morning      oxybutynin ER (DITROPAN-XL) 5 MG 24 hr tablet Take 10 mg by mouth every morning      vitamin (B COMPLEX) tablet Take 1 tablet by mouth every morning       lisinopril (ZESTRIL) 10 MG tablet Take 10 mg by mouth daily      simvastatin (ZOCOR) 20 MG tablet Take 20 mg by mouth At Bedtime          Systemic Review:   CONSTITUTIONAL: NEGATIVE for fever, chills, change in weight  ENT/MOUTH: NEGATIVE for ear, mouth and throat problems  RESP: NEGATIVE for significant cough or SOB  CV: NEGATIVE for chest pain, palpitations or peripheral edema    Physical Examination:   Vital Signs: BP (!) 184/94 (BP Location: Right arm)   Pulse (!) 48   Temp 97.7  F (36.5  C) (Temporal)   Resp 14   Ht 1.549 m (5' 1\")   Wt 68.5 kg (151 lb)   SpO2 98%   BMI 28.53 kg/m    GENERAL: healthy, alert and no distress  NECK: no adenopathy, no asymmetry, masses, or scars  RESP: lungs clear to " auscultation - no rales, rhonchi or wheezes  CV: regular rate and rhythm, normal S1 S2, no S3 or S4, no murmur, click or rub, no peripheral edema and peripheral pulses strong  ABDOMEN: soft, nontender, no hepatosplenomegaly, no masses and bowel sounds normal  MS: no gross musculoskeletal defects noted, no edema    Plan: Appropriate to proceed as scheduled.      Su Sen MD  7/13/2023

## 2023-07-13 NOTE — OP NOTE
Lumbar Epidural Steroid Injection    Procedure:  1. Lumbar epidural steroid injection at left L5-S1  2. Fluoroscopy for needle guidance    Pre-operative Diagnosis:  1. Intervertebral disc disorders w radiculopathy, lumbosacral region  2. Other intervertebral disc degeneration, lumbosacral region    Post-Operative Diagnosis:  1. Intervertebral disc disorders w radiculopathy, lumbosacral region  2. Other intervertebral disc degeneration, lumbosacral region    Anesthesia:  Local anesthesia    Medical Indications:  The patient presents to the ambulatory surgery center today for the treatment of persistent pain. We believe that the pain is spinally mediated. The patient has been exhibiting symptoms consistent with lumbar intraspinal inflammation and radiculopathy. Symptoms have been persistent, disabling and intermittently severe. The patient has been evaluated in the pain clinic and scheduled today for a spinal injection to aid in the diagnosis and treatment of presumed spinal pain. The risks, benefits, potential complications, and alternatives were discussed with the patient as per the signed consent form, and informed consent was obtained. The patient has received information about the procedure and its risks. The physician personally confirmed the procedure, side, and site to be injected with the patient and marked the site in the pre-procedure area.    Description of Procedure:  An additional intraoperative timeout, specifically to confirm accurate localization, was conducted by the attending physician. The patient remained awake and alert throughout the procedure. The patient was placed in the prone position. The skin was prepped with chlorhexidine and sterile drapes were applied. The skin and soft tissues were anesthetized with lidocaine 1%. A 22 gauge 3.5 inch touhy needle epidural needle was inserted percutaneously and advanced under fluoroscopic guidance using AP, and lateral projections. Using loss of  resistance to air and a left sided interlaminar approach, the L5/S1 posterior epidural space was entered after the lamina was contacted with the epidural needle. No paraesthesias occurred and aspiration was negative. Epidurography and radiographic interpretation: Epidurography was performed by injection of isovue 200 under live fluoroscopy. Multiple Xray images were obtained. Radiologic examination confirmed proper spread of the contrast medium within the epidural space. There was no evidence of intrathecal or intravascular runoff. The needle was injected with 40mg methylprednisolone mixed with 3 ml of 0.25% bupivacaine. The needle was removed after flushing with 1% lidocaine. A sterile bandage was applied. The patient did not experience any hemodynamic or neurologic sequelae. The patient was transferred to the recovery area. Post operative instructions were explained and given to the patient.    Complications:  No procedural or immediate post-procedural complications occurred and the patient was transferred to PACU in stable condition.      Post-Operative Plan:  The patient will monitor pain relief from today's procedure. They will call the clinic for any problems related to today's procedure. A copy of our written post-procedure instructions was provided. They will return to clinic as scheduled.

## 2023-07-13 NOTE — DISCHARGE INSTRUCTIONS
Home Care Instructions after an Epidural Steroid Pain Injection    A lumbar epidural steroid injection delivers steroid medication directly into the area that may be causing your lower back pain and/or leg pain. A cervical or thoracic epidural steroid injection delivers steroids into the epidural space surrounding spinal nerve roots to help relieve pain in the upper spine/neck.    Activity  -Rest today  -Do not work today  -Resume normal activity tomorrow  -DO NOT shower for 24 hours  -DO NOT remove bandaid for 24 hours    Pain  -You may experience soreness at the injection site for one or two days  -You may use an ice pack for 20 minutes every 2 hours for the first 24 hours  -You may use a heating pad after the first 24 hours  -You may use Tylenol (acetaminophen) every 4 hours or other pain medicines as     directed by your physician    You may experience numbness radiating into your legs or arms (depending on the procedure location). This numbness may last several hours. Until sensation returns to normal; please use caution in walking, climbing stairs, and stepping out of your vehicle, etc.    Common side effects of steroids:  Not everyone will experience corticosteroid side effects. If side effects are experienced, they will gradually subside in the 7-10 day period following an injection. Most common side effects include:  -Flushed face and/or chest  -Feeling of warmth, particularly in the face but could be an overall feeling of warmth  -Increased blood sugar in diabetic patients  -Menstrual irregularities my occur. If taking hormone-based birth control an alternate method of birth control is recommended  -Sleep disturbances and/or mood swings are possible  -Leg cramps    Please contact us if you have:  -Severe pain  -Fever more than 101.5 degrees Fahrenheit  -Signs of infection at the injection site (redness, swelling, or drainage)    FOR PAIN CENTER PATIENTS:  If you have questions, please contact the Pain  Clinic at [phone] Option #1 between the hours of 7:00 am and 3:00 pm Monday through Friday. After office hours you can contact the on call provider by dialing [phone]. If you need immediate attention, we recommend that you go to a hospital emergency room or dial [phone].      FOR PM&R PATIENTS:  For patients seen by the PM and R service, please call [phone]. (Monday through Friday 8a-5p.  After business hours and weekends call [phone] and ask for the PM and R doctor on call). If you need to fax a pain diary to PM&R the fax number is [phone]. If you are unable to fax, uploading to [facility] is encouraged, then send to provider. If you have procedure scheduling questions please call [phone] Option #2.

## 2023-09-24 ENCOUNTER — HEALTH MAINTENANCE LETTER (OUTPATIENT)
Age: 78
End: 2023-09-24

## 2023-10-18 ENCOUNTER — HOSPITAL ENCOUNTER (EMERGENCY)
Facility: CLINIC | Age: 78
Discharge: HOME | End: 2023-10-18
Payer: MEDICARE

## 2023-10-18 VITALS
OXYGEN SATURATION: 99 % | SYSTOLIC BLOOD PRESSURE: 132 MMHG | TEMPERATURE: 98.6 F | DIASTOLIC BLOOD PRESSURE: 71 MMHG | HEART RATE: 56 BPM | RESPIRATION RATE: 14 BRPM

## 2023-10-18 DIAGNOSIS — L08.9 DOG BITE OF RIGHT HAND WITH INFECTION, INITIAL ENCOUNTER: ICD-10-CM
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
## 2023-10-18 DIAGNOSIS — S61.451A DOG BITE OF RIGHT HAND WITH INFECTION, INITIAL ENCOUNTER: ICD-10-CM

## 2023-10-18 PROCEDURE — G0463 HOSPITAL OUTPT CLINIC VISIT: HCPCS

## 2023-10-18 PROCEDURE — 99203 OFFICE O/P NEW LOW 30 MIN: CPT

## 2023-10-18 NOTE — DISCHARGE INSTRUCTIONS
Augmentin as prescribed for the next 7 days.  Please return if symptoms are worsening or not improving after a few days.  Return sooner if you note markedly worsening symptoms.

## 2023-10-18 NOTE — ED PROVIDER NOTES
History     Chief Complaint   Patient presents with    Dog Bite     Patient's dog bit her right wrist last night.      HPI  Tracey Mandujano is a 78 year old female who presents urgent care for concern of dog bite.  Patient states she sustained a dog bite from her small poodle mix dog last night on the right hand and wrist..  It is the patient's dog and she states the dog is up-to-date completely on its vaccinations.  Patient states that today she noted erythema developing around the dog bite prompting her visit today.  She reports full mobility of the hand and has no significant hand pain. She denies having any fevers and states she is otherwise feeling well.      Allergies:  Allergies   Allergen Reactions    Hydromorphone Other (See Comments)     Mental change, hallucinations        Problem List:    Patient Active Problem List    Diagnosis Date Noted    Neuropathic pain 06/30/2020     Priority: Medium     Added automatically from request for surgery [id number]      Lumbar radiculopathy 11/08/2019     Priority: Medium     Added automatically from request for surgery [id number]          Past Medical History:    Past Medical History:   Diagnosis Date    Arthritis     Cancer (H)     Complication of anesthesia     Diabetes (H)     Hypertension     Parkinsons disease (H)     Sepsis (H)        Past Surgical History:    Past Surgical History:   Procedure Laterality Date    ABDOMEN SURGERY      BIOPSY      COLONOSCOPY      GI SURGERY      lap band    GYN SURGERY      hysterectomy    INJECT EPIDURAL LUMBAR N/A 9/8/2020    Procedure: Left Lumbar 5- Sacral 1 epidural steroid injection with fluoroscopy;  Surgeon: Su Sen MD;  [facility]    INJECT EPIDURAL LUMBAR Left 1/26/2021    Procedure: Left Lumbar 5- Sacral 1 epidural steroid injection with fluoroscopy;  Surgeon: Su Sen MD;  [place] [facility]    INJECT EPIDURAL LUMBAR Left 3/23/2021    Procedure: Left Lumbar 5- Sacral 1 epidural steroid injection with  fluoroscopy;  Surgeon: Su Sen MD;  Location: [facility]    INJECT EPIDURAL LUMBAR Left 2021    Procedure: Lumbar epidural steroid injection at left L5-S1 with fluoroscopy;  Surgeon: Su Sen MD;  Location: [facility]    INJECT EPIDURAL LUMBAR Left 3/24/2022    Procedure: Lumbar epidural steroid injection at left L5-S1;  Surgeon: uS Sen MD;  Location: [facility] OR    INJECT EPIDURAL LUMBAR N/A 2022    Procedure: L5- S1 epidural steroid injection with fluoroscopy (left>Right);  Surgeon: Su Sen MD;  Location: [facility]    INJECT EPIDURAL LUMBAR Left 2023    Procedure: Lumbar epidural steroid injection at (left > right ) L5-S1 with fluoroscopy;  Surgeon: Su Sen MD;  Location: [facility]    INJECT EPIDURAL LUMBAR / SACRAL SINGLE Left 2019    Procedure: Left Lumbar 5-sacral 1 interlaminar epidural steroid injection with fluoroscopy;  Surgeon: Su Sen MD;  [facility]    ORTHOPEDIC SURGERY      right shoulder replacement, left total knee replacement       Family History:    No family history on file.    Social History:  Marital Status:   [5]  Social History     Tobacco Use    Smoking status: Former     Packs/day: 0.00     Years: 0.00     Additional pack years: 0.00     Total pack years: 0.00     Types: Cigarettes     Start date: 1965     Quit date: 1980     Years since quittin.8    Smokeless tobacco: Never   Substance Use Topics    Alcohol use: Not Currently    Drug use: Never        Medications:    amoxicillin-clavulanate (AUGMENTIN) 875-125 MG tablet  carbidopa-levodopa (SINEMET)  MG tablet  cholecalciferol 1000 units TABS  citalopram (CELEXA) 40 MG tablet  folic acid (FOLVITE) 1 MG tablet  gabapentin (NEURONTIN) 300 MG capsule  lisinopril (ZESTRIL) 10 MG tablet  Multiple Vitamins-Minerals (MULTIVITAMIN WOMEN PO)  oxybutynin ER (DITROPAN-XL) 5 MG 24 hr tablet  simvastatin (ZOCOR) 20 MG tablet  vitamin (B COMPLEX) tablet          Review of  Systems   All other systems reviewed and are negative.    See HPI  Physical Exam   BP: 132/71  Pulse: 56  Temp: 98.6  F (37  C)  Resp: 14  SpO2: 99 %      Physical Exam  Constitutional:       General: She is not in acute distress.     Appearance: Normal appearance. She is well-developed.   HENT:      Head: Normocephalic and atraumatic.   Eyes:      General: No scleral icterus.     Conjunctiva/sclera: Conjunctivae normal.   Cardiovascular:      Rate and Rhythm: Normal rate.   Pulmonary:      Effort: Pulmonary effort is normal. No respiratory distress.   Abdominal:      General: Abdomen is flat.   Musculoskeletal:      Cervical back: Normal range of motion and neck supple.   Skin:     General: Skin is warm and dry.      Capillary Refill: Capillary refill takes less than 2 seconds.      Findings: Erythema and wound (Dog bite multiple puncture wounds and surrounding erythema and mild swelling on the dorsal aspect of the right wrist and hand.  See image below.) present. No rash.   Neurological:      Mental Status: She is alert and oriented to person, place, and time.         ED Course                 Procedures         No results found for this or any previous visit (from the past 24 hour(s)).    Medications - No data to display    Assessments & Plan (with Medical Decision Making)   Patient presented to urgent care for concern of dog bite.  She is afebrile on arrival vital signs otherwise reassuring.  History and physical as above.  Patient displays full mobility of the hand and there is no indication for an x-ray at this time.  No indication for wound closure.  Marked erythema noted surrounding the dog bite leading to concern of a cellulitis.  May also be some underlying bruising contributing to the discoloration.  We will treat with Augmentin for 7 days.  Wound was thoroughly cleaned and irrigated in the department today.  Cellulitis borders were outlined.  Patient was provided with strict return precautions to return  for worsening symptoms or if not improving.  Patient is agreeable to the plan and she was discharged in good condition.    I have reviewed the nursing notes.    I have reviewed the findings, diagnosis, plan and need for follow up with the patient.      New Prescriptions    AMOXICILLIN-CLAVULANATE (AUGMENTIN) 875-125 MG TABLET    Take 1 tablet by mouth 2 times daily for 7 days       Final diagnoses:   Dog bite of right hand with infection, initial encounter       10/18/2023   [facility] EMERGENCY DEPT       Guillermo Gore, OPAL CNP  10/18/23 [id number]

## 2023-10-19 ENCOUNTER — OFFICE VISIT (OUTPATIENT)
Dept: URGENT CARE | Facility: URGENT CARE | Age: 78
End: 2023-10-19
Payer: MEDICARE

## 2023-10-19 VITALS
SYSTOLIC BLOOD PRESSURE: 128 MMHG | OXYGEN SATURATION: 93 % | HEART RATE: 53 BPM | RESPIRATION RATE: 14 BRPM | DIASTOLIC BLOOD PRESSURE: 80 MMHG | TEMPERATURE: 97.5 F

## 2023-10-19 DIAGNOSIS — Z86.19 HISTORY OF SEPSIS: ICD-10-CM

## 2023-10-19 DIAGNOSIS — L08.9 INFECTED DOG BITE: ICD-10-CM

## 2023-10-19 DIAGNOSIS — L03.113 CELLULITIS OF RIGHT HAND: Primary | ICD-10-CM
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
## 2023-10-19 PROCEDURE — 99204 OFFICE O/P NEW MOD 45 MIN: CPT | Performed by: NURSE PRACTITIONER

## 2023-10-19 NOTE — PROGRESS NOTES
Assessment & Plan     Cellulitis of right hand    Infected dog bite    History of sepsis       Recommend further evaluation in emergency room for worsening cellulitis and infected dog bite of right hand with history of sepsis as IV abx, surgical debridement may be indicated. Patient agreeable and discharged in stable condition.        Shaila Smith NP  [facility] [place]    Subjective     Mare is a 78 year old female who presents to clinic today for the following health issues:  Chief Complaint   Patient presents with    Urgent Care    Derm Problem     Per patient states she was seen yesterday for left hand dog bite give antibiotics took two doses but feels redness is spreading.      Patient presents for evaluation of right hand redness which has been worsening. She was bit by dog yesterday and given Augmentin in ED and has taken 2 doses. Redness has spread outside of outline. Associated symptoms: swelling, pain, warmth. Denies fever. Pain is severe with touching.     She has a history of sepsis, type 2 diabetes.     Problem list, Medication list, Allergies, and Medical history reviewed in EPIC.    ROS:  Review of systems negative except for noted above          Objective    /80   Pulse 53   Temp 97.5  F (36.4  C) (Tympanic)   Resp 14   SpO2 93%   Physical Exam  Constitutional:       General: She is not in acute distress.     Appearance: She is not toxic-appearing or diaphoretic.   Cardiovascular:      Pulses: Normal pulses.   Musculoskeletal:      Comments: Moderate swelling right hand with severe tenderness with palpation   Skin:     General: Skin is warm and dry.      Findings: Erythema present.      Comments: Erythema right hand outside of outlined area with increased warmth, no abscess or drainage noted. Dog bite skin puncture   Neurological:      Mental Status: She is alert.

## 2024-02-01 ENCOUNTER — TELEPHONE (OUTPATIENT)
Dept: ANESTHESIOLOGY | Facility: CLINIC | Age: 79
End: 2024-02-01
Payer: MEDICARE

## 2024-02-01 NOTE — TELEPHONE ENCOUNTER
Patient would like to schedule procedure with dr. Sen. Please place a case request for this patient.

## 2024-06-30 ENCOUNTER — HEALTH MAINTENANCE LETTER (OUTPATIENT)
Age: 79
End: 2024-06-30

## 2024-07-15 ENCOUNTER — OFFICE VISIT (OUTPATIENT)
Dept: URGENT CARE | Facility: URGENT CARE | Age: 79
End: 2024-07-15
Payer: MEDICARE

## 2024-07-15 VITALS
OXYGEN SATURATION: 98 % | TEMPERATURE: 98.6 F | HEART RATE: 56 BPM | DIASTOLIC BLOOD PRESSURE: 77 MMHG | SYSTOLIC BLOOD PRESSURE: 137 MMHG

## 2024-07-15 DIAGNOSIS — R30.0 DYSURIA: ICD-10-CM

## 2024-07-15 DIAGNOSIS — R41.0 CONFUSION: Primary | ICD-10-CM

## 2024-07-15 LAB
ALBUMIN UR-MCNC: ABNORMAL MG/DL
APPEARANCE UR: CLEAR
BILIRUB UR QL STRIP: NEGATIVE
CLUE CELLS: ABNORMAL
COLOR UR AUTO: YELLOW
GLUCOSE UR STRIP-MCNC: NEGATIVE MG/DL
HGB UR QL STRIP: NEGATIVE
KETONES UR STRIP-MCNC: ABNORMAL MG/DL
LEUKOCYTE ESTERASE UR QL STRIP: NEGATIVE
MUCOUS THREADS #/AREA URNS LPF: PRESENT /LPF
NITRATE UR QL: NEGATIVE
PH UR STRIP: 5.5 [PH] (ref 5–7)
RBC #/AREA URNS AUTO: ABNORMAL /HPF
SP GR UR STRIP: 1.02 (ref 1–1.03)
SQUAMOUS #/AREA URNS AUTO: ABNORMAL /LPF
TRICHOMONAS, WET PREP: ABNORMAL
UROBILINOGEN UR STRIP-ACNC: 0.2 E.U./DL
WBC #/AREA URNS AUTO: ABNORMAL /HPF
WBC'S/HIGH POWER FIELD, WET PREP: ABNORMAL
YEAST, WET PREP: ABNORMAL

## 2024-07-15 PROCEDURE — 99213 OFFICE O/P EST LOW 20 MIN: CPT | Performed by: STUDENT IN AN ORGANIZED HEALTH CARE EDUCATION/TRAINING PROGRAM

## 2024-07-15 PROCEDURE — 81001 URINALYSIS AUTO W/SCOPE: CPT | Performed by: STUDENT IN AN ORGANIZED HEALTH CARE EDUCATION/TRAINING PROGRAM

## 2024-07-15 PROCEDURE — 87210 SMEAR WET MOUNT SALINE/INK: CPT | Performed by: STUDENT IN AN ORGANIZED HEALTH CARE EDUCATION/TRAINING PROGRAM

## 2024-07-15 RX ORDER — AMITRIPTYLINE HYDROCHLORIDE 10 MG/1
10 TABLET ORAL DAILY
COMMUNITY
Start: 2024-07-02 | End: 2024-07-15

## 2024-07-15 RX ORDER — DULOXETIN HYDROCHLORIDE 60 MG/1
60 CAPSULE, DELAYED RELEASE ORAL DAILY
COMMUNITY
Start: 2024-06-07

## 2024-07-15 RX ORDER — CEPHALEXIN 500 MG/1
500 CAPSULE ORAL 3 TIMES DAILY
COMMUNITY
Start: 2024-07-09 | End: 2024-07-19

## 2024-07-15 NOTE — PATIENT INSTRUCTIONS
-No evidence of UTI right now, complete the course of antibiotics as prescribed  -STOP amitryptaline as it is known to cause confusion/delirium in elderly patients  -Please discuss this with your neurologist  -If any new fevers, confusion episodes that don't improve please go to the ED.

## 2024-07-15 NOTE — PROGRESS NOTES
Assessment & Plan     Dysuria, resolved  Patient is currently being treated for UTI, repeat UA in clinic today shows resolution of pyuria and patient does no longer having dysuria. Wet prep negative as well.  - UA Macroscopic with reflex to Microscopic and Culture  - Wet preparation  - UA Macroscopic with reflex to Microscopic and Culture  - Wet preparation  - UA Microscopic with Reflex to Culture    Confusion  Patient has been having intermittent bouts of confusion and agitation especially at night over the last few days. She did have a a new medication that was recently started, amitriptyline which is known to cause anticholinergic toxicity in the elderly in particular. Patient has a history of Parkinson's disease as well and I suspect her brain is more vulnerable to this medication.  -Discontinue amitriptyline, and discuss further with neurology             Return if symptoms worsen or fail to improve, Follow up with your neurologist about medication changes..    Jeff Garvye MD  [facility]     Mare is a 78 year old female who presents to clinic today for the following health issues:  Chief Complaint   Patient presents with    UTI     Irritable and increased confusion. Sx 6 days. Pt was recently seen and diagnosed UTI but sx persisting despite meds.          7/15/2024     6:14 PM   Additional Questions   Roomed by Lor   Accompanied by Son Rodo ANTOINE    Patient w/ history of PD. On Thursday changed her medications per Neurologist the next day she was disoriented, confused and combative. She went to the ED and was found to have a UTI about 6 days ago. She was started on Keflex. Unfortunately, had another episode of confusion and agitation last night which prompted patient to be brought to urgent care today    Of note, patient was started on amitriptyline for urinary incontinence by patient's neurologist on 7/2.        Objective    /77 (BP Location: Left  arm, Cuff Size: Adult Regular)   Pulse 56   Temp 98.6  F (37  C) (Tympanic)   SpO2 98%   Physical Exam   Constitutional: No Acute Distress. Awake and alert  Eyes: anicteric, EOMI, PERRLA  ENT: oropharynx clear, MMM, no Cervical LAD  Respiratory: good air movement, clear to auscultation bilaterally, no crackles or wheezing  Cardiovascular: regular rate and rhythm, normal S1 and S2, no murmur noted  GI: normal bowel sounds, soft, non-distended, non-tender, no masses palpated, no hepatosplenomegaly  Skin: No rashes, or suspicious lesions  Musculoskeletal: No pedal edema  Neurologic: no focal neurologic deficits appreciated    Results for orders placed or performed in visit on 07/15/24 (from the past 24 hour(s))   UA Macroscopic with reflex to Microscopic and Culture    Specimen: Urine, Clean Catch   Result Value Ref Range    Color Urine Yellow Colorless, Straw, Light Yellow, Yellow    Appearance Urine Clear Clear    Glucose Urine Negative Negative mg/dL    Bilirubin Urine Negative Negative    Ketones Urine Trace (A) Negative mg/dL    Specific Gravity Urine 1.025 1.003 - 1.035    Blood Urine Negative Negative    pH Urine 5.5 5.0 - 7.0    Protein Albumin Urine Trace (A) Negative mg/dL    Urobilinogen Urine 0.2 0.2, 1.0 E.U./dL    Nitrite Urine Negative Negative    Leukocyte Esterase Urine Negative Negative   Wet preparation    Specimen: Vagina; Swab   Result Value Ref Range    Trichomonas Absent Absent    Yeast Absent Absent    Clue Cells Absent Absent    WBCs/high power field 1+ (A) None   UA Microscopic with Reflex to Culture   Result Value Ref Range    RBC Urine 2-5 (A) 0-2 /HPF /HPF    WBC Urine 0-5 0-5 /HPF /HPF    Squamous Epithelials Urine Few (A) None Seen /LPF    Mucus Urine Present (A) None Seen /LPF    Narrative    Urine Culture not indicated

## 2025-07-13 ENCOUNTER — HEALTH MAINTENANCE LETTER (OUTPATIENT)
Age: 80
End: 2025-07-13

## (undated) DEVICE — TUBING EXTENSION SET MICROBORE 6" LL 2N1194

## (undated) DEVICE — SYR 07ML EPIDURAL LOSS OF RESISTANCE PULSATOR 4905

## (undated) DEVICE — NDL 22GA 1.5"

## (undated) DEVICE — GLOVE PROTEXIS POWDER FREE SMT 6.5  2D72PT65X

## (undated) DEVICE — TRAY PAIN INJECTION 97A 640

## (undated) DEVICE — NDL EPIDURAL TUOHY 22GA 3.5" 4911-22

## (undated) DEVICE — PREP CHLORAPREP W/ORANGE TINT 10.5ML 260715

## (undated) DEVICE — TUBING EXTENSION SET MICROBORE 8.2" LL 7N8310

## (undated) DEVICE — GLOVE ESTEEM POWDER FREE SMT 6.5  2D72PT65